# Patient Record
Sex: MALE | Race: BLACK OR AFRICAN AMERICAN | NOT HISPANIC OR LATINO | Employment: FULL TIME | URBAN - METROPOLITAN AREA
[De-identification: names, ages, dates, MRNs, and addresses within clinical notes are randomized per-mention and may not be internally consistent; named-entity substitution may affect disease eponyms.]

---

## 2017-05-02 ENCOUNTER — HOSPITAL ENCOUNTER (EMERGENCY)
Facility: HOSPITAL | Age: 37
Discharge: HOME | End: 2017-05-02
Attending: EMERGENCY MEDICINE

## 2017-05-02 VITALS
RESPIRATION RATE: 20 BRPM | HEIGHT: 64 IN | BODY MASS INDEX: 23.9 KG/M2 | DIASTOLIC BLOOD PRESSURE: 82 MMHG | WEIGHT: 140 LBS | TEMPERATURE: 98 F | SYSTOLIC BLOOD PRESSURE: 128 MMHG | OXYGEN SATURATION: 97 % | HEART RATE: 62 BPM

## 2017-05-02 DIAGNOSIS — R10.9 RIGHT FLANK PAIN: Primary | ICD-10-CM

## 2017-05-02 DIAGNOSIS — Z87.442 HISTORY OF KIDNEY STONES: ICD-10-CM

## 2017-05-02 PROCEDURE — 96372 THER/PROPH/DIAG INJ SC/IM: CPT

## 2017-05-02 PROCEDURE — 25000003 PHARM REV CODE 250: Performed by: PHYSICIAN ASSISTANT

## 2017-05-02 PROCEDURE — 63600175 PHARM REV CODE 636 W HCPCS: Performed by: PHYSICIAN ASSISTANT

## 2017-05-02 PROCEDURE — 99283 EMERGENCY DEPT VISIT LOW MDM: CPT | Mod: 25

## 2017-05-02 RX ORDER — TAMSULOSIN HYDROCHLORIDE 0.4 MG/1
0.4 CAPSULE ORAL DAILY
Qty: 10 CAPSULE | Refills: 0 | Status: SHIPPED | OUTPATIENT
Start: 2017-05-02 | End: 2018-09-30

## 2017-05-02 RX ORDER — ONDANSETRON 4 MG/1
4 TABLET, FILM COATED ORAL EVERY 12 HOURS PRN
Qty: 12 TABLET | Refills: 0 | Status: SHIPPED | OUTPATIENT
Start: 2017-05-02 | End: 2018-09-30

## 2017-05-02 RX ORDER — HYDROMORPHONE HYDROCHLORIDE 2 MG/ML
0.5 INJECTION, SOLUTION INTRAMUSCULAR; INTRAVENOUS; SUBCUTANEOUS
Status: COMPLETED | OUTPATIENT
Start: 2017-05-02 | End: 2017-05-02

## 2017-05-02 RX ORDER — HYDROCODONE BITARTRATE AND ACETAMINOPHEN 5; 325 MG/1; MG/1
1 TABLET ORAL EVERY 6 HOURS PRN
COMMUNITY
End: 2018-09-30

## 2017-05-02 RX ORDER — CEPHALEXIN 500 MG/1
500 CAPSULE ORAL EVERY 6 HOURS
COMMUNITY
End: 2018-09-30

## 2017-05-02 RX ORDER — ONDANSETRON 4 MG/1
4 TABLET, ORALLY DISINTEGRATING ORAL
Status: COMPLETED | OUTPATIENT
Start: 2017-05-02 | End: 2017-05-02

## 2017-05-02 RX ORDER — OXYCODONE AND ACETAMINOPHEN 10; 325 MG/1; MG/1
1 TABLET ORAL EVERY 6 HOURS PRN
Qty: 12 TABLET | Refills: 0 | Status: SHIPPED | OUTPATIENT
Start: 2017-05-02 | End: 2018-09-30

## 2017-05-02 RX ADMIN — ONDANSETRON 4 MG: 4 TABLET, ORALLY DISINTEGRATING ORAL at 07:05

## 2017-05-02 RX ADMIN — HYDROMORPHONE HYDROCHLORIDE 0.5 MG: 2 INJECTION INTRAMUSCULAR; INTRAVENOUS; SUBCUTANEOUS at 07:05

## 2017-05-02 NOTE — ED AVS SNAPSHOT
[facility]  [place] [address]               Francisco Powell   2017  6:17 PM   ED    Description:  Male : 1980   Department:  [facility]           Your Care was Coordinated By:     Provider Role From To    Yunior Teran MD Attending Provider 17 --    Tripp Hurtado PA-C Physician Assistant 17 --      Reason for Visit     Flank Pain           Diagnoses this Visit        Comments    Right flank pain    -  Primary     History of kidney stones           ED Disposition     None           To Do List           Follow-up Information     Follow up with Mary Beard MD. Schedule an appointment as soon as possible for a visit in 1 day.    Specialty:  Urology    Why:  For further evaluation    Contact information:    [address]  [place] [address]  [phone]          Go to [facility].    Specialty:  Emergency Medicine    Why:  If symptoms worsen    Contact information:    [place] [id number] [phone]       These Medications        Disp Refills Start End    oxycodone-acetaminophen (PERCOCET)  mg per tablet 12 tablet 0 2017     Take 1 tablet by mouth every 6 (six) hours as needed for Pain. - Oral    ondansetron (ZOFRAN) 4 MG tablet 12 tablet 0 2017     Take 1 tablet (4 mg total) by mouth every 12 (twelve) hours as needed. - Oral    tamsulosin (FLOMAX) 0.4 mg Cp24 10 capsule 0 2017    Take 1 capsule (0.4 mg total) by mouth once daily. - Oral      [facility]     [facility] On [facility] Nurse Care Line -  Assistance  Unless otherwise directed by your provider, please contact [facility], our nurse care line that is available for  assistance.     Registered nurses in the [facility] provide: appointment scheduling, clinical advisement, health education, and other advisory  services.  Call: [phone] (toll free)               Medications           Message regarding Medications     Verify the changes and/or additions to your medication regime listed below are the same as discussed with your clinician today.  If any of these changes or additions are incorrect, please notify your healthcare provider.        START taking these NEW medications        Refills    oxycodone-acetaminophen (PERCOCET)  mg per tablet 0    Sig: Take 1 tablet by mouth every 6 (six) hours as needed for Pain.    Class: Print    Route: Oral    ondansetron (ZOFRAN) 4 MG tablet 0    Sig: Take 1 tablet (4 mg total) by mouth every 12 (twelve) hours as needed.    Class: Print    Route: Oral    tamsulosin (FLOMAX) 0.4 mg Cp24 0    Sig: Take 1 capsule (0.4 mg total) by mouth once daily.    Class: Print    Route: Oral      These medications were administered today        Dose Freq    hydromorphone (PF) injection 0.5 mg 0.5 mg ED 1 Time    Sig: Inject 0.25 mLs (0.5 mg total) into the muscle ED 1 Time.    Class: Normal    Route: Intramuscular    ondansetron disintegrating tablet 4 mg 4 mg ED 1 Time    Sig: Take 1 tablet (4 mg total) by mouth ED 1 Time.    Class: Normal    Route: Oral           Verify that the below list of medications is an accurate representation of the medications you are currently taking.  If none reported, the list may be blank. If incorrect, please contact your healthcare provider. Carry this list with you in case of emergency.           Current Medications     cephALEXin (KEFLEX) 500 MG capsule Take 500 mg by mouth every 6 (six) hours.    hydrocodone-acetaminophen 5-325mg (NORCO) 5-325 mg per tablet Take 1 tablet by mouth every 6 (six) hours as needed for Pain.    hydromorphone (PF) injection 0.5 mg Inject 0.25 mLs (0.5 mg total) into the muscle ED 1 Time.    ondansetron (ZOFRAN) 4 MG tablet Take 1 tablet (4 mg total) by mouth every 12 (twelve) hours as needed.    ondansetron disintegrating  "tablet 4 mg Take 1 tablet (4 mg total) by mouth ED 1 Time.    oxycodone-acetaminophen (PERCOCET)  mg per tablet Take 1 tablet by mouth every 6 (six) hours as needed for Pain.    tamsulosin (FLOMAX) 0.4 mg Cp24 Take 1 capsule (0.4 mg total) by mouth once daily.           Clinical Reference Information           Your Vitals Were     BP Pulse Temp Resp Height Weight    125/84 (BP Location: Right arm, Patient Position: Sitting) 63 97.7 °F (36.5 °C) (Oral) 18 5' 4" (1.626 m) 63.5 kg (140 lb)    SpO2 BMI             95% 24.03 kg/m2         Allergies as of 5/2/2017     No Known Allergies      Immunizations Administered on Date of Encounter - 5/2/2017     None      ED Micro, Lab, POCT     Start Ordered       Status Ordering Provider    05/02/17 1800 05/02/17 1759    Once,   Status:  Canceled      Canceled       ED Imaging Orders     None      Discharge References/Attachments     KIDNEY STONES, UNDERSTANDING (ENGLISH)      [facility] Sign-Up     Activating your [facility] account is as easy as 1-2-3!     1) Visit [email or web address], select Sign Up Now, enter this activation code and your date of birth, then select Next.  [email or web address]  Expires: 6/16/2017  7:06 PM      2) Create a username and password to use when you visit [facility] in the future and select a security question in case you lose your password and select Next.    3) Enter your e-mail address and click Sign Up!    Additional Information  If you have questions, please e-mail [email or web address] or call [phone] to talk to our [facility] staff. Remember, [facility] is NOT to be used for urgent needs. For medical emergencies, dial 911.          [facility] complies with applicable Federal civil rights laws and does not discriminate on the basis of race, color, national origin, age, disability, or sex.        Language Assistance Services     ATTENTION: Language assistance services are available, free of charge. Please call [phone].  "     ATENCIÓN: Si habla español, tiene a bah disposición servicios gratuitos de asistencia lingüística. Llame al [phone].     CHÚ Ý: N?u b?n nói Ti?ng Vi?t, có các d?ch v? h? tr? ngôn ng? mi?n phí dành cho b?n. G?i s? [phone].

## 2017-05-02 NOTE — ED PROVIDER NOTES
"Encounter Date: 5/2/2017    SCRIBE #1 NOTE: I, Kasia Bloom , am scribing for, and in the presence of,  Tripp Hurtado PA-C . I have scribed the following portions of the note - Other sections scribed: HPI/ROS .       History     Chief Complaint   Patient presents with    Flank Pain     Pt reports pain for 2 months, and "now just hit me and worsened. I think I may have a kidney stone". No dysuria reported     Review of patient's allergies indicates:  No Known Allergies  HPI Comments: CC: Flank Pain     HPI: This 37 y.o. Male smoker presents to the ED c/o a few-days hx of acute-onset, intermittent, stabbing, worsening R sided flank pain. Pt states he has been experiencing similar episodes of flank pain intermittently for the past year. He reports a full workup for kidney stones at [facility] yesterday where he was dx with a 3 mm stone and was prescribed Keflex and Norco. However, pt states his pain continues to be severe, prompting his arrival to the ED today. Pt otherwise denies fever, SOB, CP, abdominal pain, N/V/D, dysuria, urinary frequency, testicular pain.    The history is provided by the patient. No  was used.     History reviewed. No pertinent past medical history.  History reviewed. No pertinent surgical history.  History reviewed. No pertinent family history.  Social History   Substance Use Topics    Smoking status: Current Every Day Smoker     Types: Cigarettes    Smokeless tobacco: None    Alcohol use Yes      Comment: rarely     Review of Systems   Constitutional: Negative for chills and fever.   HENT: Negative for ear pain and sore throat.    Eyes: Negative for pain and visual disturbance.   Respiratory: Negative for cough and shortness of breath.    Cardiovascular: Negative for chest pain.   Gastrointestinal: Negative for abdominal pain, diarrhea, nausea and vomiting.   Genitourinary: Positive for flank pain (R side). Negative for difficulty urinating, dysuria, hematuria, " scrotal swelling and testicular pain.        (-) dark urine    Musculoskeletal: Negative for back pain and neck pain.   Skin: Negative for rash.   Neurological: Negative for headaches.       Physical Exam   Initial Vitals   BP Pulse Resp Temp SpO2   05/02/17 1801 05/02/17 1801 05/02/17 1801 05/02/17 1801 05/02/17 1801   125/84 63 18 97.7 °F (36.5 °C) 95 %     Physical Exam    Nursing note and vitals reviewed.  Constitutional: He appears well-developed and well-nourished. He is not diaphoretic. No distress.   HENT:   Head: Normocephalic and atraumatic.   Nose: Nose normal.   Eyes: Conjunctivae and EOM are normal. Right eye exhibits no discharge. Left eye exhibits no discharge.   Neck: Normal range of motion. No tracheal deviation present. No JVD present.   Cardiovascular: Normal rate, regular rhythm and normal heart sounds. Exam reveals no friction rub.    No murmur heard.  Pulmonary/Chest: Breath sounds normal. No stridor. No respiratory distress. He has no wheezes. He has no rhonchi. He has no rales. He exhibits no tenderness.   Abdominal: Soft. He exhibits no distension. There is no tenderness. There is no rigidity, no rebound, no guarding, no CVA tenderness, no tenderness at McBurney's point and negative Mcgovern's sign.   Musculoskeletal: Normal range of motion. He exhibits tenderness (Mild reproducible TTP of R lumbar musculature. ).   No midline tenderness or bony deformities noted down the neck and spine. Ambulating well, without limp or pain.   Neurological: He is alert and oriented to person, place, and time.   Skin: Skin is warm and dry. No rash and no abscess noted. No erythema. No pallor.         ED Course   Procedures  Labs Reviewed - No data to display          Medical Decision Making:   History:   Old Medical Records: I decided to obtain old medical records.    This is an emergent evaluation of a 37 y.o. male with no PMHx presenting to the ED for R flank pain s/p being diagnosed with renal stone at [facility]  yesterday; requesting stronger pain medication. Denies fever, urinary symptoms, testicular pain, and abdominal pain. Vitals WNL, afebrile. Patient is non-toxic appearing and in no acute distress. Abdomen soft and nontender. Mild muscular TTP may be contributory to symptoms. Patient presents with documentation of a CT being performed yesterday at [facility] and being diagnosed with a renal stone by Dr. Washington. I discuss this case with Dr. Teran who advises no further workup in the ED indicated at this time. I doubt appendicitis, testicular torsion, SBO, and HZV. Currently on Keflex which covers for pyelonephritis. No uroseptic.     Given pain medication in ED. Discharged home with Percocet, Zofran, and Flomax. Instructed to follow up with urology for reevaluation and management of symptoms.     I discussed with the patient the diagnosis, treatment plan, indications for return to the emergency department, and for expected follow-up. The patient verbalized an understanding. The patient is asked if there are any questions or concerns. We discuss the case, until all issues are addressed to the patients satisfaction. Patient understands and is agreeable to the plan.     I discussed this patient with Dr. Teran who is in agreement with my assessment and plan.           Scribe Attestation:   Scribe #1: I performed the above scribed service and the documentation accurately describes the services I performed. I attest to the accuracy of the note.    Attending Attestation:     Physician Attestation Statement for NP/PA:   I have conducted a face to face encounter with this patient in addition to the NP/PA, due to NP/PA Request    Other NP/PA Attestation Additions:      Medical Decision Making: Patient presents with flank pain.  Was at another facility yesterday and diagnosed with a kidney stone.  Has follow-up with urology.  Here for uncontrolled pain.  No fever.  Vital signs stable.  Tolerating by mouth.  I do not feel  repeat workup is warranted this time.  We will treat the patient's pain and patient to follow-up with urology.       Physician Attestation for Scribe:  Physician Attestation Statement for Scribe #1: I, Tripp Hurtado PA-C , reviewed documentation, as scribed by Kasia Bloom  in my presence, and it is both accurate and complete.                 ED Course     Clinical Impression:   The primary encounter diagnosis was Right flank pain. A diagnosis of History of kidney stones was also pertinent to this visit.    Disposition:   Disposition: Discharged  Condition: Stable       Tripp Hurtado PA-C  05/02/17 2148       Yunior Teran MD  05/30/17 2121

## 2017-05-02 NOTE — ED TRIAGE NOTES
C/o rt. Flank pain x 1 week. Visited [facility]  2 days ago. Prescribed meds. Has only taken 1 dose of keflex today. C/o of worsening pain. . Denies fever. C/o nausea.

## 2018-09-30 ENCOUNTER — HOSPITAL ENCOUNTER (EMERGENCY)
Facility: HOSPITAL | Age: 38
Discharge: HOME | End: 2018-09-30
Attending: EMERGENCY MEDICINE
Payer: MEDICAID

## 2018-09-30 VITALS
HEART RATE: 55 BPM | TEMPERATURE: 98 F | RESPIRATION RATE: 17 BRPM | BODY MASS INDEX: 23.32 KG/M2 | OXYGEN SATURATION: 100 % | DIASTOLIC BLOOD PRESSURE: 86 MMHG | SYSTOLIC BLOOD PRESSURE: 145 MMHG | HEIGHT: 65 IN | WEIGHT: 140 LBS

## 2018-09-30 DIAGNOSIS — R10.9 RIGHT FLANK PAIN: Primary | ICD-10-CM

## 2018-09-30 DIAGNOSIS — R31.29 MICROSCOPIC HEMATURIA: ICD-10-CM

## 2018-09-30 LAB
ALBUMIN SERPL BCP-MCNC: 4.2 G/DL
ALP SERPL-CCNC: 60 U/L
ALT SERPL W/O P-5'-P-CCNC: 14 U/L
ANION GAP SERPL CALC-SCNC: 7 MMOL/L
AST SERPL-CCNC: 17 U/L
BACTERIA #/AREA URNS AUTO: ABNORMAL /HPF
BASOPHILS # BLD AUTO: 0.03 K/UL
BASOPHILS NFR BLD: 0.3 %
BILIRUB SERPL-MCNC: 0.5 MG/DL
BILIRUB UR QL STRIP: NEGATIVE
BUN SERPL-MCNC: 12 MG/DL
CALCIUM SERPL-MCNC: 9.9 MG/DL
CHLORIDE SERPL-SCNC: 108 MMOL/L
CLARITY UR REFRACT.AUTO: CLEAR
CO2 SERPL-SCNC: 28 MMOL/L
COLOR UR AUTO: YELLOW
CREAT SERPL-MCNC: 1.2 MG/DL
DIFFERENTIAL METHOD: ABNORMAL
EOSINOPHIL # BLD AUTO: 0.1 K/UL
EOSINOPHIL NFR BLD: 1 %
ERYTHROCYTE [DISTWIDTH] IN BLOOD BY AUTOMATED COUNT: 13.1 %
EST. GFR  (AFRICAN AMERICAN): >60 ML/MIN/1.73 M^2
EST. GFR  (NON AFRICAN AMERICAN): >60 ML/MIN/1.73 M^2
GLUCOSE SERPL-MCNC: 87 MG/DL
GLUCOSE UR QL STRIP: NEGATIVE
HCT VFR BLD AUTO: 41.7 %
HGB BLD-MCNC: 12.5 G/DL
HGB UR QL STRIP: ABNORMAL
IMM GRANULOCYTES # BLD AUTO: 0.02 K/UL
IMM GRANULOCYTES NFR BLD AUTO: 0.2 %
KETONES UR QL STRIP: NEGATIVE
LEUKOCYTE ESTERASE UR QL STRIP: NEGATIVE
LIPASE SERPL-CCNC: 23 U/L
LYMPHOCYTES # BLD AUTO: 3.2 K/UL
LYMPHOCYTES NFR BLD: 35.8 %
MCH RBC QN AUTO: 24.6 PG
MCHC RBC AUTO-ENTMCNC: 30 G/DL
MCV RBC AUTO: 82 FL
MICROSCOPIC COMMENT: ABNORMAL
MONOCYTES # BLD AUTO: 0.6 K/UL
MONOCYTES NFR BLD: 7.1 %
NEUTROPHILS # BLD AUTO: 4.9 K/UL
NEUTROPHILS NFR BLD: 55.6 %
NITRITE UR QL STRIP: NEGATIVE
NRBC BLD-RTO: 0 /100 WBC
PH UR STRIP: 6 [PH] (ref 5–8)
PLATELET # BLD AUTO: 185 K/UL
PMV BLD AUTO: 8.9 FL
POTASSIUM SERPL-SCNC: 3.8 MMOL/L
PROT SERPL-MCNC: 7 G/DL
PROT UR QL STRIP: NEGATIVE
RBC # BLD AUTO: 5.09 M/UL
RBC #/AREA URNS AUTO: 19 /HPF (ref 0–4)
SODIUM SERPL-SCNC: 143 MMOL/L
SP GR UR STRIP: 1.02 (ref 1–1.03)
URN SPEC COLLECT METH UR: ABNORMAL
UROBILINOGEN UR STRIP-ACNC: NEGATIVE EU/DL
WBC # BLD AUTO: 8.88 K/UL
WBC #/AREA URNS AUTO: 7 /HPF (ref 0–5)

## 2018-09-30 PROCEDURE — 96361 HYDRATE IV INFUSION ADD-ON: CPT

## 2018-09-30 PROCEDURE — 25000003 PHARM REV CODE 250: Performed by: PHYSICIAN ASSISTANT

## 2018-09-30 PROCEDURE — 96374 THER/PROPH/DIAG INJ IV PUSH: CPT

## 2018-09-30 PROCEDURE — 80053 COMPREHEN METABOLIC PANEL: CPT

## 2018-09-30 PROCEDURE — 87086 URINE CULTURE/COLONY COUNT: CPT

## 2018-09-30 PROCEDURE — 99284 EMERGENCY DEPT VISIT MOD MDM: CPT | Mod: 25

## 2018-09-30 PROCEDURE — 99284 EMERGENCY DEPT VISIT MOD MDM: CPT | Mod: ,,, | Performed by: PHYSICIAN ASSISTANT

## 2018-09-30 PROCEDURE — 83690 ASSAY OF LIPASE: CPT

## 2018-09-30 PROCEDURE — 63600175 PHARM REV CODE 636 W HCPCS: Performed by: PHYSICIAN ASSISTANT

## 2018-09-30 PROCEDURE — 81001 URINALYSIS AUTO W/SCOPE: CPT

## 2018-09-30 PROCEDURE — 96375 TX/PRO/DX INJ NEW DRUG ADDON: CPT

## 2018-09-30 PROCEDURE — 85025 COMPLETE CBC W/AUTO DIFF WBC: CPT

## 2018-09-30 RX ORDER — IBUPROFEN 800 MG/1
800 TABLET ORAL EVERY 6 HOURS PRN
Qty: 20 TABLET | Refills: 0 | Status: SHIPPED | OUTPATIENT
Start: 2018-09-30 | End: 2019-09-06 | Stop reason: ALTCHOICE

## 2018-09-30 RX ORDER — ONDANSETRON 2 MG/ML
4 INJECTION INTRAMUSCULAR; INTRAVENOUS
Status: COMPLETED | OUTPATIENT
Start: 2018-09-30 | End: 2018-09-30

## 2018-09-30 RX ORDER — KETOROLAC TROMETHAMINE 30 MG/ML
10 INJECTION, SOLUTION INTRAMUSCULAR; INTRAVENOUS
Status: COMPLETED | OUTPATIENT
Start: 2018-09-30 | End: 2018-09-30

## 2018-09-30 RX ORDER — CEFTRIAXONE 1 G/1
1 INJECTION, POWDER, FOR SOLUTION INTRAMUSCULAR; INTRAVENOUS
Status: COMPLETED | OUTPATIENT
Start: 2018-09-30 | End: 2018-09-30

## 2018-09-30 RX ADMIN — SODIUM CHLORIDE 1000 ML: 0.9 INJECTION, SOLUTION INTRAVENOUS at 08:09

## 2018-09-30 RX ADMIN — ONDANSETRON HYDROCHLORIDE 4 MG: 2 INJECTION, SOLUTION INTRAMUSCULAR; INTRAVENOUS at 08:09

## 2018-09-30 RX ADMIN — KETOROLAC TROMETHAMINE 10 MG: 30 INJECTION, SOLUTION INTRAMUSCULAR at 08:09

## 2018-09-30 RX ADMIN — CEFTRIAXONE SODIUM 1 G: 1 INJECTION, POWDER, FOR SOLUTION INTRAMUSCULAR; INTRAVENOUS at 09:09

## 2018-10-01 NOTE — ED PROVIDER NOTES
Encounter Date: 9/30/2018       History     Chief Complaint   Patient presents with    Flank Pain     Patient reports that he was diagnosed with renal stones x 4 years. Patient has not followed up with a Urologist. Pt having right flank pain     Patient is a 38-year-old male presenting to the ER for evaluation of right-sided flank pain. Patient states the symptoms started earlier today.  He describes it as intermittent and sharp.  Patient states that he did have 1 episode of vomiting yesterday.  He does have some slight nausea at this time.  He denies dysuria or hematuria.  He has noticed some urinary hesitancy over the last 1 week.  He denies radiation of this pain to the abdomen.  He denies testicular pain, redness or swelling.  Denies any penile discharge. No concerns for STDs.  Denies any fever chills at home.  Patient states that he was diagnosed with kidney stones many years ago but felt as though he has not passed any of them.  He has not seen a urologist for this in the past.  No prior abdominal surgeries.  Denies chest pain or shortness of breath.      The history is provided by the patient.     Review of patient's allergies indicates:  No Known Allergies  History reviewed. No pertinent past medical history.  History reviewed. No pertinent surgical history.  History reviewed. No pertinent family history.  Social History     Tobacco Use    Smoking status: Current Every Day Smoker     Types: Cigarettes   Substance Use Topics    Alcohol use: Yes     Comment: rarely    Drug use: Not on file     Review of Systems   Constitutional: Negative for chills and fever.   HENT: Negative for congestion.    Respiratory: Negative for cough and shortness of breath.    Cardiovascular: Negative for chest pain and palpitations.   Gastrointestinal: Positive for nausea and vomiting. Negative for abdominal pain.   Genitourinary: Positive for difficulty urinating (hesitancy ) and flank pain. Negative for dysuria, hematuria,  scrotal swelling, testicular pain and urgency.   Musculoskeletal: Negative for back pain.   Skin: Negative for rash.   Allergic/Immunologic: Negative for immunocompromised state.   Neurological: Negative for dizziness and weakness.   Hematological: Does not bruise/bleed easily.   Psychiatric/Behavioral: Negative for confusion.       Physical Exam     Initial Vitals [09/30/18 1941]   BP Pulse Resp Temp SpO2   134/82 72 18 98.2 °F (36.8 °C) 98 %      MAP       --         Physical Exam    Constitutional: He appears well-developed and well-nourished. He is not diaphoretic. No distress.   HENT:   Head: Normocephalic and atraumatic.   Eyes: Conjunctivae and EOM are normal.   Neck: Neck supple.   Cardiovascular: Normal rate, regular rhythm, normal heart sounds and intact distal pulses.   Pulmonary/Chest: Breath sounds normal.   Abdominal: Soft. Normal appearance. He exhibits no distension. Bowel sounds are absent. There is tenderness (rigth flank). There is CVA tenderness (right). There is no rigidity, no rebound and no guarding.   Neurological: He is alert and oriented to person, place, and time.   Skin: Skin is warm and dry.         ED Course   Procedures  Labs Reviewed   CBC W/ AUTO DIFFERENTIAL - Abnormal; Notable for the following components:       Result Value    Hemoglobin 12.5 (*)     MCH 24.6 (*)     MCHC 30.0 (*)     MPV 8.9 (*)     All other components within normal limits   COMPREHENSIVE METABOLIC PANEL - Abnormal; Notable for the following components:    Anion Gap 7 (*)     All other components within normal limits   URINALYSIS, REFLEX TO URINE CULTURE - Abnormal; Notable for the following components:    Occult Blood UA 1+ (*)     All other components within normal limits    Narrative:     Preferred Collection Type->Urine, Clean Catch   URINALYSIS MICROSCOPIC - Abnormal; Notable for the following components:    RBC, UA 19 (*)     WBC, UA 7 (*)     All other components within normal limits    Narrative:      Preferred Collection Type->Urine, Clean Catch   CULTURE, URINE   CULTURE, URINE   LIPASE          Imaging Results          CT Renal Stone Study ABD Pelvis WO (Final result)     Abnormal  Result time 09/30/18 21:17:56    Final result by Mert Hoang MD (09/30/18 21:17:56)                 Impression:      Right renal large staghorn type calculus with additional nephroliths, but no definite associated hydronephrosis.    Otherwise, no acute process seen on this noncontrast CT.    Right hepatic few scattered subcentimeter low-attenuation parenchymal foci which are too small to characterize but statistically likely represent cysts.    Right lower lobe 2-3 mm ground-glass nodule.  For a ground glass nodule <6 mm, Fleischner Society 2017 guidelines recommend no routine follow up. However, suspicious features could warrant follow up with non-contrast chest CT at 2 years and 4 years after discovery.    This report was flagged in Epic as abnormal.      Electronically signed by: Mert Hoang MD  Date:    09/30/2018  Time:    21:17             Narrative:    EXAMINATION:  CT RENAL STONE STUDY ABD PELVIS WO    CLINICAL HISTORY:  Flank pain, stone disease suspected;right;    TECHNIQUE:  Low dose axial images, sagittal and coronal reformations were obtained from the lung bases to the pubic symphysis.  Contrast was not administered.    COMPARISON:  None    FINDINGS:  Included lung bases show a 2-3 mm ground-glass nodule within the posterior basal segment right lower lobe.  Base of the heart is within normal limits.    The study is somewhat limited by paucity of intra-abdominal fat with closely apposed loops of bowel.  Liver is normal in size containing a few scattered subcentimeter low-attenuation parenchymal foci within the right hepatic lobe.  Noncontrast appearance of the gallbladder, pancreas, spleen, stomach, duodenum and bilateral adrenal glands are within normal limits.  No biliary ductal dilatation.    Bilateral kidneys  are normal in size, shape and location.  There is a large staghorn type calcification within the right renal pelvis with a few additional subcentimeter nephroliths at the upper and lower poles.  No radiodense calculus seen within the right ureter, left collecting system or urinary bladder.  No definite hydronephrosis or significant perinephric stranding on either side.  Urinary bladder is suboptimally distended.  Prostate and seminal vesicles are within normal limits.  Pelvic phleboliths noted.    No ascites, free air or lymphadenopathy definitively seen.  Aorta is nonaneurysmal.    Appendix and terminal ileum are within normal limits.  No evidence of bowel obstruction or inflammation.  No pneumatosis or portal venous gas.    Included osseous structures appear intact.                                       APC / Resident Notes:   Patient was seen in the ER promptly upon arrival.  He is afebrile, no acute distress. Physical examination reveals right CVA tenderness and tenderness over the right flank.  Abdomen soft, nondistended. Patient was given Toradol and Zofran in ED.    Laboratory studies show normal white count of 8.8.  Hemoglobin stable.  Chemistries were fairly unremarkable. Normal liver and kidney functions.  Lipase normal. Urinalysis does reveal 19 RBC and 7 WBC.  No leukocytosis.  Urine was sent for culture.  Pending results.    There is concern for kidney stones.  CT of the abdomen reveals right renal large staghorn type calculus with additional nephrolith.  No evidence of hydronephrosis.    There are other findings including right lower lobe lung nodule and the hepatic abnormality likely to represent a cyst.  Patient from on these abnormal findings.  He is to follow up with family doctor this week.    Suspect patient's symptoms are likely secondary to a passed kidney stone.  Will prescribe patient home on ibuprofen for discomfort.  Will give information for follow up with [facility].  Patient was given  strict return precautions. The care of this patient was overseen by attending physician who agrees with treatment, plan, and disposition.           Attending Attestation:     Physician Attestation Statement for NP/PA:   I discussed this assessment and plan of this patient with the NP/PA, but I did not personally examine the patient. The face to face encounter was performed by the NP/PA.                     Clinical Impression:   The primary encounter diagnosis was Right flank pain. A diagnosis of Microscopic hematuria was also pertinent to this visit.      Disposition:   Disposition: Discharged  Condition: Stable                        Fabienne Kennedy PA-C  09/30/18 2232       Alvina Alicia MD  10/01/18 0144

## 2018-10-01 NOTE — ED TRIAGE NOTES
Pt reports to ED with c/o right sided flank pain r/t to kidney stones he reports he's been dx with x 6yrs. Pt has not seen urologist. Pt denies fever/chills. Pt rates pain 10/10.     Patient identifiers verified and correct  LOC: The patient is awake, alert and aware of environment with an appropriate affect, the patient is oriented x 3 and speaking appropriately.   APPEARANCE: Patient appears comfortable and in no acute distress, patient is clean and well groomed.  SKIN: The skin is warm and dry, color consistent with ethnicity, patient has normal skin turgor and moist mucus membranes, skin intact, no breakdown or bruising noted.   MUSCULOSKELETAL: Patient moving all extremities spontaneously, no swelling noted.  RESPIRATORY: Airway is open and patent, respirations are spontaneous, patient has a normal effort and rate, no accessory muscle use noted

## 2018-10-02 LAB — BACTERIA UR CULT: NO GROWTH

## 2018-11-19 ENCOUNTER — TELEPHONE (OUTPATIENT)
Dept: UROLOGY | Facility: CLINIC | Age: 38
End: 2018-11-19

## 2018-11-19 NOTE — TELEPHONE ENCOUNTER
----- Message from Monie Canales sent at 11/19/2018  1:26 PM CST -----  Contact: [facility] / [phone] or Sergio Faustin [phone]  Patient is requesting a call back regarding, called to make an appointment, but wants to be seen this week if possible. Did want want to wait for the first available. Please advise

## 2018-11-27 ENCOUNTER — TELEPHONE (OUTPATIENT)
Dept: UROLOGY | Facility: CLINIC | Age: 38
End: 2018-11-27

## 2018-11-27 NOTE — TELEPHONE ENCOUNTER
----- Message from Marbella Muñoz sent at 11/27/2018 12:39 PM CST -----  Can Lorenzo see pt with History of Calculus of Kidney, Referral Order scanned in EPIC

## 2018-11-28 ENCOUNTER — OFFICE VISIT (OUTPATIENT)
Dept: UROLOGY | Facility: CLINIC | Age: 38
End: 2018-11-28
Payer: MEDICAID

## 2018-11-28 VITALS
OXYGEN SATURATION: 98 % | HEIGHT: 64 IN | SYSTOLIC BLOOD PRESSURE: 158 MMHG | HEART RATE: 57 BPM | BODY MASS INDEX: 23.22 KG/M2 | RESPIRATION RATE: 18 BRPM | WEIGHT: 136 LBS | DIASTOLIC BLOOD PRESSURE: 83 MMHG

## 2018-11-28 DIAGNOSIS — R35.1 NOCTURIA: ICD-10-CM

## 2018-11-28 DIAGNOSIS — R39.198 SLOW URINARY STREAM: ICD-10-CM

## 2018-11-28 DIAGNOSIS — N23 RENAL COLIC ON RIGHT SIDE: ICD-10-CM

## 2018-11-28 DIAGNOSIS — N20.0 STAGHORN RENAL CALCULUS: Primary | ICD-10-CM

## 2018-11-28 DIAGNOSIS — N20.0 STAGHORN CALCULUS: Primary | ICD-10-CM

## 2018-11-28 DIAGNOSIS — R11.2 NAUSEA AND VOMITING, INTRACTABILITY OF VOMITING NOT SPECIFIED, UNSPECIFIED VOMITING TYPE: ICD-10-CM

## 2018-11-28 DIAGNOSIS — R39.15 URINARY URGENCY: ICD-10-CM

## 2018-11-28 DIAGNOSIS — N20.0 KIDNEY STONE: ICD-10-CM

## 2018-11-28 LAB
BILIRUB SERPL-MCNC: ABNORMAL MG/DL
BLOOD URINE, POC: ABNORMAL
COLOR, POC UA: YELLOW
GLUCOSE UR QL STRIP: ABNORMAL
KETONES UR QL STRIP: ABNORMAL
LEUKOCYTE ESTERASE URINE, POC: ABNORMAL
NITRITE, POC UA: ABNORMAL
PH, POC UA: 8
PROTEIN, POC: 30
SPECIFIC GRAVITY, POC UA: 1.02
UROBILINOGEN, POC UA: 0.2

## 2018-11-28 PROCEDURE — 87086 URINE CULTURE/COLONY COUNT: CPT

## 2018-11-28 PROCEDURE — 99999 PR PBB SHADOW E&M-EST. PATIENT-LVL V: CPT | Mod: PBBFAC,,, | Performed by: NURSE PRACTITIONER

## 2018-11-28 PROCEDURE — 81002 URINALYSIS NONAUTO W/O SCOPE: CPT | Mod: PBBFAC,PO | Performed by: NURSE PRACTITIONER

## 2018-11-28 PROCEDURE — 99204 OFFICE O/P NEW MOD 45 MIN: CPT | Mod: S$PBB,,, | Performed by: NURSE PRACTITIONER

## 2018-11-28 PROCEDURE — 99215 OFFICE O/P EST HI 40 MIN: CPT | Mod: PBBFAC,PO | Performed by: NURSE PRACTITIONER

## 2018-11-28 RX ORDER — LIDOCAINE HYDROCHLORIDE 20 MG/ML
JELLY TOPICAL ONCE
Status: CANCELLED | OUTPATIENT
Start: 2018-11-28 | End: 2018-11-28

## 2018-11-28 RX ORDER — CIPROFLOXACIN 2 MG/ML
400 INJECTION, SOLUTION INTRAVENOUS
Status: CANCELLED | OUTPATIENT
Start: 2018-11-28

## 2018-11-28 RX ORDER — SODIUM CHLORIDE 9 MG/ML
INJECTION, SOLUTION INTRAVENOUS CONTINUOUS
Status: CANCELLED | OUTPATIENT
Start: 2018-11-28

## 2018-11-28 RX ORDER — OXYCODONE AND ACETAMINOPHEN 10; 325 MG/1; MG/1
1 TABLET ORAL EVERY 6 HOURS PRN
Qty: 20 TABLET | Refills: 0 | Status: ON HOLD | OUTPATIENT
Start: 2018-11-28 | End: 2018-12-04

## 2018-11-28 RX ORDER — KETOROLAC TROMETHAMINE 10 MG/1
10 TABLET, FILM COATED ORAL EVERY 6 HOURS
Qty: 20 TABLET | Refills: 1 | Status: ON HOLD | OUTPATIENT
Start: 2018-11-28 | End: 2018-12-04

## 2018-11-28 RX ORDER — ONDANSETRON HYDROCHLORIDE 8 MG/1
8 TABLET, FILM COATED ORAL EVERY 8 HOURS PRN
Qty: 21 TABLET | Refills: 0 | Status: ON HOLD | OUTPATIENT
Start: 2018-11-28 | End: 2018-12-06

## 2018-11-28 NOTE — PATIENT INSTRUCTIONS
1. Doctor note needed with restrictions for work due to renal colic.  2. Urine dipstick  3. Urine culture  4. May take Zofran as directed for nausea and vomiting.  5. Take toradol during the day as directed for pain; take Percocet at bedtime for pain.  6. Schedule right lithotripsy with Dr. Anne on 12/03/2018.  7. Consent discussed with patient and signed by patient.  8. Follow-up post-op.

## 2018-11-28 NOTE — PROGRESS NOTES
"Subjective:       Patient ID: Francisco Powell is a 38 y.o. male.    Chief Complaint: Nephrolithiasis    Patient is new to me. He is a 37 yo AAM who is here today for kidney stones. He reports this a recurrent issue for him, but the pain has intensified which has caused him to seek treatment. He has been seen in the ED on 11/17/18, 9/30/18, and 5/02/18 for right flank pain. CT was performed on 11/17/18 and showed "Large staghorn calculus of the right renal pelvis with nonobstructing stones also seen throughout the right kidney." Patient denies left flank pain at this time. Patient is here today with his girlfriend.       Flank Pain   This is a recurrent problem. The problem occurs daily. The problem has been gradually worsening since onset. The pain is present in the costovertebral angle (right). The quality of the pain is described as stabbing. The pain does not radiate. The pain is at a severity of 10/10. The pain is severe. The symptoms are aggravated by sitting and position. Pertinent negatives include no abdominal pain, bladder incontinence, bowel incontinence, dysuria, fever, headaches, pelvic pain or weakness. He has tried NSAIDs (Ibuprofen) for the symptoms. The treatment provided no relief.     Review of Systems   Constitutional: Positive for appetite change, chills (decreased) and diaphoresis. Negative for fatigue and fever.   Gastrointestinal: Positive for constipation, nausea and vomiting. Negative for abdominal pain, bowel incontinence and diarrhea.        Bloating   Genitourinary: Positive for flank pain and urgency. Negative for bladder incontinence, decreased urine volume, difficulty urinating, discharge, dysuria, frequency, hematuria, pelvic pain, penile pain, penile swelling, scrotal swelling and testicular pain.        Nocturia x2-3  Straining with urination  Hesitancy  Slow urinary stream     Neurological: Positive for dizziness. Negative for weakness and headaches.   Psychiatric/Behavioral: " Negative.        Objective:      Physical Exam   Constitutional: He is oriented to person, place, and time. He appears well-developed and well-nourished. No distress.   HENT:   Head: Normocephalic and atraumatic.   Eyes: EOM are normal. Pupils are equal, round, and reactive to light.   Neck: Normal range of motion.   Cardiovascular: Bradycardia present.   Pulmonary/Chest: Effort normal. No respiratory distress.   Abdominal: Soft. There is no tenderness.   Musculoskeletal: Normal range of motion.   Right CVAT    Neurological: He is alert and oriented to person, place, and time. Coordination normal.   Skin: Skin is warm and dry.   Psychiatric: He has a normal mood and affect. His behavior is normal. Judgment and thought content normal.   Nursing note and vitals reviewed.      Assessment:       1. Renal stones    2. Renal colic on right side    3. Nausea and vomiting, intractability of vomiting not specified, unspecified vomiting type    4. Urinary urgency    5. Slow urinary stream    6. Nocturia        Plan:       Francisco was seen today for nephrolithiasis.    Diagnoses and all orders for this visit:    Staghorn renal calculus    Renal colic on right side    Nausea and vomiting, intractability of vomiting not specified, unspecified vomiting type  -     ondansetron (ZOFRAN) 8 MG tablet; Take 1 tablet (8 mg total) by mouth every 8 (eight) hours as needed for Nausea.    Urinary urgency  -     POCT URINE DIPSTICK WITHOUT MICROSCOPE  -     Urine culture    Slow urinary stream    Nocturia    Other orders  1. Take toradol during the day as directed for pain; take Percocet at bedtime for pain.  2. Schedule right lithotripsy with Dr. Anne on 12/03/2018.  3. Consent discussed with patient and signed by patient.    Follow-up post-op.    Angella Spivey NP

## 2018-11-28 NOTE — LETTER
November 28, 2018      Nanda Bui, PA  [address]  [facility] [address]           [facility]  [address] [place] [address] [address]  [facility] [address]  Phone: [phone]  Fax: [phone]          Patient: Francisco Powell   MR Number: [id number]   YOB: 1980   Date of Visit: 11/28/2018       Dear Nanda Bui:    Thank you for referring Francisco Powell to me for evaluation. Attached you will find relevant portions of my assessment and plan of care.    If you have questions, please do not hesitate to call me. I look forward to following Francisco Powell along with you.    Sincerely,    Angella Spivey, NP    Enclosure  CC:  No Recipients    If you would like to receive this communication electronically, please contact [email or web address] or [phone] to request more information on [employer] Link access.    For providers and/or their staff who would like to refer a patient to [facility], please contact us through our one-stop-shop provider referral line, [facility] , at [phone].    If you feel you have received this communication in error or would no longer like to receive these types of communications, please e-mail [email or web address]

## 2018-11-29 LAB — BACTERIA UR CULT: NO GROWTH

## 2018-11-30 ENCOUNTER — TELEPHONE (OUTPATIENT)
Dept: UROLOGY | Facility: CLINIC | Age: 38
End: 2018-11-30

## 2018-11-30 NOTE — TELEPHONE ENCOUNTER
----- Message from Angella Spivey NP sent at 11/30/2018 11:14 AM CST -----  Please inform patient his urine cx was normal. He does not have a UTI.

## 2018-12-03 PROBLEM — N20.0 STAGHORN CALCULUS: Status: ACTIVE | Noted: 2018-12-03

## 2018-12-04 DIAGNOSIS — N20.0 KIDNEY STONE: Primary | ICD-10-CM

## 2018-12-04 RX ORDER — SODIUM CHLORIDE 9 MG/ML
INJECTION, SOLUTION INTRAVENOUS CONTINUOUS
Status: CANCELLED | OUTPATIENT
Start: 2018-12-04

## 2018-12-04 RX ORDER — LIDOCAINE HYDROCHLORIDE 20 MG/ML
JELLY TOPICAL ONCE
Status: CANCELLED | OUTPATIENT
Start: 2018-12-04 | End: 2018-12-04

## 2018-12-04 RX ORDER — CIPROFLOXACIN 2 MG/ML
400 INJECTION, SOLUTION INTRAVENOUS
Status: CANCELLED | OUTPATIENT
Start: 2018-12-04

## 2018-12-05 DIAGNOSIS — N20.0 KIDNEY STONE: Primary | ICD-10-CM

## 2018-12-11 ENCOUNTER — TELEPHONE (OUTPATIENT)
Dept: UROLOGY | Facility: CLINIC | Age: 38
End: 2018-12-11

## 2018-12-11 NOTE — TELEPHONE ENCOUNTER
----- Message from Karla Doyle sent at 12/10/2018  5:04 PM CST -----  Contact: [phone]/ [facility]  Pharmacy would like to speak with you about the following medication as diagnosis is needed before filling. Please advise.     1. oxyCODONE-acetaminophen (PERCOCET)  mg per tablet

## 2018-12-12 ENCOUNTER — OFFICE VISIT (OUTPATIENT)
Dept: UROLOGY | Facility: CLINIC | Age: 38
End: 2018-12-12
Payer: MEDICAID

## 2018-12-12 VITALS
DIASTOLIC BLOOD PRESSURE: 72 MMHG | HEIGHT: 64 IN | BODY MASS INDEX: 23.56 KG/M2 | RESPIRATION RATE: 19 BRPM | HEART RATE: 71 BPM | OXYGEN SATURATION: 99 % | SYSTOLIC BLOOD PRESSURE: 114 MMHG | WEIGHT: 138 LBS

## 2018-12-12 DIAGNOSIS — N20.0 STAGHORN RENAL CALCULUS: ICD-10-CM

## 2018-12-12 DIAGNOSIS — R30.0 DYSURIA: ICD-10-CM

## 2018-12-12 DIAGNOSIS — N23 RENAL COLIC ON RIGHT SIDE: ICD-10-CM

## 2018-12-12 DIAGNOSIS — N20.0 KIDNEY STONE: Primary | ICD-10-CM

## 2018-12-12 DIAGNOSIS — Z98.890 POST-OPERATIVE STATE: Primary | ICD-10-CM

## 2018-12-12 LAB
BILIRUB SERPL-MCNC: NORMAL MG/DL
BLOOD URINE, POC: NORMAL
COLOR, POC UA: YELLOW
GLUCOSE UR QL STRIP: NORMAL
KETONES UR QL STRIP: NORMAL
LEUKOCYTE ESTERASE URINE, POC: NORMAL
NITRITE, POC UA: NORMAL
PH, POC UA: 6.5
PROTEIN, POC: 100
SPECIFIC GRAVITY, POC UA: 1.01
UROBILINOGEN, POC UA: 0.2

## 2018-12-12 PROCEDURE — 99214 OFFICE O/P EST MOD 30 MIN: CPT | Mod: PBBFAC,PO | Performed by: NURSE PRACTITIONER

## 2018-12-12 PROCEDURE — 81002 URINALYSIS NONAUTO W/O SCOPE: CPT | Mod: PBBFAC,PO | Performed by: NURSE PRACTITIONER

## 2018-12-12 PROCEDURE — 99999 PR PBB SHADOW E&M-EST. PATIENT-LVL IV: CPT | Mod: PBBFAC,,, | Performed by: NURSE PRACTITIONER

## 2018-12-12 PROCEDURE — 99024 POSTOP FOLLOW-UP VISIT: CPT | Mod: ,,, | Performed by: NURSE PRACTITIONER

## 2018-12-12 RX ORDER — CIPROFLOXACIN 2 MG/ML
400 INJECTION, SOLUTION INTRAVENOUS
Status: CANCELLED | OUTPATIENT
Start: 2018-12-12

## 2018-12-12 RX ORDER — SODIUM CHLORIDE 9 MG/ML
INJECTION, SOLUTION INTRAVENOUS CONTINUOUS
Status: CANCELLED | OUTPATIENT
Start: 2018-12-12

## 2018-12-12 RX ORDER — LIDOCAINE HYDROCHLORIDE 20 MG/ML
JELLY TOPICAL ONCE
Status: CANCELLED | OUTPATIENT
Start: 2018-12-12 | End: 2018-12-12

## 2018-12-12 NOTE — PATIENT INSTRUCTIONS
Urine dipstick  Continue to strain urine at home.  Low oxalate diet (handout provided)  Schedule stone washout with Dr. Anne for Monday, 12/17/2018.  Consent discussed with patient and signed by patient.   Follow-up post op.

## 2018-12-12 NOTE — PROGRESS NOTES
Subjective:       Patient ID: Francisco Powell is a 38 y.o. male.    Chief Complaint: Nephrolithiasis    Patient is here today for his post op evaluation. He is S/P right lithotripsy on 12/6/2018 and right ureteroscopy with stent placement on 12/3/2018 by Dr. Anne. Patient still has some right flank tenderness and reported hematuria with clots after sx. Hematuria last noted 1 week ago and now has resolved. He reports passing sand, but no particles large enough to capture. KUB performed yesterday and not multiple stone fragments in right kidney. Right ureter stent also noted. Results reviewed with patient. Patient is here today with his significant other.       Other   Chronicity: Right lithotripsy  Episode onset: 12/6/2018. The problem occurs daily. The problem has been gradually improving. Associated symptoms include urinary symptoms (dysuria and hematuria). Pertinent negatives include no abdominal pain, change in bowel habit, chills, fatigue, fever, headaches, nausea, swollen glands, vomiting or weakness. Nothing aggravates the symptoms. Treatments tried: Right ureteroscopy with stent and lithotripsy. The treatment provided mild relief.     Review of Systems   Constitutional: Negative for appetite change, chills, fatigue and fever.   Gastrointestinal: Negative for abdominal pain, blood in stool, change in bowel habit, constipation, diarrhea, nausea and vomiting.   Genitourinary: Positive for dysuria, flank pain (right), hematuria and urgency. Negative for decreased urine volume, difficulty urinating, discharge, frequency, penile pain, penile swelling, scrotal swelling and testicular pain.   Neurological: Negative for dizziness, weakness and headaches.   Psychiatric/Behavioral: Negative.        Objective:      Physical Exam   Constitutional: He is oriented to person, place, and time. He appears well-developed and well-nourished. No distress.   HENT:   Head: Normocephalic and atraumatic.   Eyes: EOM are normal.  Pupils are equal, round, and reactive to light.   Neck: Normal range of motion.   Cardiovascular: Normal rate.   Pulmonary/Chest: Effort normal. No respiratory distress.   Abdominal: Soft. There is no tenderness.   Musculoskeletal: Normal range of motion. He exhibits no edema.   Right CVAT   Neurological: He is alert and oriented to person, place, and time. Coordination normal.   Skin: Skin is warm and dry.   Psychiatric: He has a normal mood and affect. His behavior is normal. Judgment and thought content normal.   Nursing note and vitals reviewed.      Assessment:       1. Post-operative state    2. Staghorn renal calculus    3. Renal colic on right side        Plan:         Francisco was seen today for nephrolithiasis.    Diagnoses and all orders for this visit:    Post-operative state    Staghorn renal calculus    Renal colic on right side    Dysuria  -     POCT URINE DIPSTICK WITHOUT MICROSCOPE    Other orders  1. Continue to strain urine at home.  2. Low oxalate diet (handout provided)  3. Schedule stone washout with Dr. Anne for Monday, 12/17/2018.  4. Consent discussed with patient and signed by patient.     Follow-up post op.     Angella Spivey NP

## 2019-01-02 ENCOUNTER — TELEPHONE (OUTPATIENT)
Dept: UROLOGY | Facility: CLINIC | Age: 39
End: 2019-01-02

## 2019-01-02 NOTE — TELEPHONE ENCOUNTER
"Spoke to patient about his 3pm appt today for his post op with leydi- I told patient he did not need to come to this po op aapt since he did not have his surgery and the patient stated "he wasn't coming anyways"  "

## 2019-08-20 ENCOUNTER — TELEPHONE (OUTPATIENT)
Dept: UROLOGY | Facility: CLINIC | Age: 39
End: 2019-08-20

## 2019-08-20 NOTE — TELEPHONE ENCOUNTER
----- Message from Frannie Nuñez sent at 8/20/2019 10:38 AM CDT -----  Contact: pt  Pt would like to be called back regarding getting a appt    Pt can be reached at [phone] or [phone]

## 2019-08-26 ENCOUNTER — OFFICE VISIT (OUTPATIENT)
Dept: UROLOGY | Facility: CLINIC | Age: 39
End: 2019-08-26
Payer: MEDICAID

## 2019-08-26 VITALS
WEIGHT: 130 LBS | DIASTOLIC BLOOD PRESSURE: 87 MMHG | RESPIRATION RATE: 19 BRPM | BODY MASS INDEX: 22.2 KG/M2 | HEART RATE: 67 BPM | OXYGEN SATURATION: 98 % | HEIGHT: 64 IN | SYSTOLIC BLOOD PRESSURE: 132 MMHG

## 2019-08-26 DIAGNOSIS — Z09 ENCOUNTER FOR EXAMINATION FOLLOWING TREATMENT AT HOSPITAL: Primary | ICD-10-CM

## 2019-08-26 DIAGNOSIS — N20.0 KIDNEY STONE: ICD-10-CM

## 2019-08-26 DIAGNOSIS — N23 RENAL COLIC ON RIGHT SIDE: ICD-10-CM

## 2019-08-26 DIAGNOSIS — R30.0 DYSURIA: ICD-10-CM

## 2019-08-26 DIAGNOSIS — R31.0 HEMATURIA, GROSS: ICD-10-CM

## 2019-08-26 DIAGNOSIS — N20.1 RIGHT URETERAL STONE: ICD-10-CM

## 2019-08-26 LAB
BILIRUB SERPL-MCNC: ABNORMAL MG/DL
BLOOD URINE, POC: ABNORMAL
COLOR, POC UA: YELLOW
GLUCOSE UR QL STRIP: ABNORMAL
KETONES UR QL STRIP: ABNORMAL
LEUKOCYTE ESTERASE URINE, POC: ABNORMAL
NITRITE, POC UA: ABNORMAL
PH, POC UA: 7
PROTEIN, POC: 100
SPECIFIC GRAVITY, POC UA: 1.01
UROBILINOGEN, POC UA: 0.2

## 2019-08-26 PROCEDURE — 99214 OFFICE O/P EST MOD 30 MIN: CPT | Mod: S$PBB,,, | Performed by: NURSE PRACTITIONER

## 2019-08-26 PROCEDURE — 99999 PR PBB SHADOW E&M-EST. PATIENT-LVL IV: ICD-10-PCS | Mod: PBBFAC,,, | Performed by: NURSE PRACTITIONER

## 2019-08-26 PROCEDURE — 99214 OFFICE O/P EST MOD 30 MIN: CPT | Mod: PBBFAC,PO | Performed by: NURSE PRACTITIONER

## 2019-08-26 PROCEDURE — 87086 URINE CULTURE/COLONY COUNT: CPT

## 2019-08-26 PROCEDURE — 99214 PR OFFICE/OUTPT VISIT, EST, LEVL IV, 30-39 MIN: ICD-10-PCS | Mod: S$PBB,,, | Performed by: NURSE PRACTITIONER

## 2019-08-26 PROCEDURE — 99999 PR PBB SHADOW E&M-EST. PATIENT-LVL IV: CPT | Mod: PBBFAC,,, | Performed by: NURSE PRACTITIONER

## 2019-08-26 PROCEDURE — 81002 URINALYSIS NONAUTO W/O SCOPE: CPT | Mod: PBBFAC,PO | Performed by: NURSE PRACTITIONER

## 2019-08-26 RX ORDER — CEPHALEXIN 500 MG/1
CAPSULE ORAL
Refills: 0 | COMMUNITY
Start: 2019-07-13 | End: 2019-09-13 | Stop reason: ALTCHOICE

## 2019-08-26 RX ORDER — PHENAZOPYRIDINE HYDROCHLORIDE 100 MG/1
100 TABLET, FILM COATED ORAL 3 TIMES DAILY PRN
Qty: 15 TABLET | Refills: 0 | Status: SHIPPED | OUTPATIENT
Start: 2019-08-26 | End: 2019-08-31

## 2019-08-26 RX ORDER — TAMSULOSIN HYDROCHLORIDE 0.4 MG/1
0.4 CAPSULE ORAL DAILY
Qty: 30 CAPSULE | Refills: 0 | Status: SHIPPED | OUTPATIENT
Start: 2019-08-26 | End: 2019-09-24

## 2019-08-26 NOTE — PATIENT INSTRUCTIONS
1. Urine dipstick and urine cx  2. Start Tamsulosin 0.4 mg daily to help pass stones in right ureter.  3. Take Pyridium for pain with urinating.   4. Continue Cipro (antibiotic).  5. Strain urine at home and bring in any collected stone particles to clinic for analysis.   6. Drink 2 liters of water daily.   7. Schedule lithotripsy and possible stent removal by Dr. Anne.

## 2019-08-26 NOTE — PROGRESS NOTES
"Subjective:       Patient ID: Francisco Powell is a 39 y.o. male.    Chief Complaint: Nephrolithiasis (patient has stent)    Patient is a 38 yo AAM who is here today for an ER follow-up. Patient was seen in the ER on 8/18/19 with complaints of dysuria, penile pain, and blood in his urine. CT was performed at that time and showed, "The right kidney contains 2 large stones the largest of which measuring 0.9 cm.  There are numerous stones identified within the proximal and mid right ureter measuring up to 0.6 cm." Right ureteral stent in place. Patient was a no show to his post-op appt after cystoscopy, retrograde pyelogram with ureteral stent placement by Dr. Anne on 12/17/18. Patient reports he was having personal issues at that time.     Dysuria    This is a new problem. The current episode started 1 to 4 weeks ago. The problem has been gradually improving. The quality of the pain is described as burning. The pain is at a severity of 10/10. The pain is severe. There has been no fever. He is sexually active. There is no history of pyelonephritis. Associated symptoms include frequency, hematuria and urgency. Pertinent negatives include no behavior changes, chills, discharge, flank pain, hesitancy, nausea, sweats, vomiting, weight loss, bubble bath use, constipation or withholding. He has tried antibiotics for the symptoms. The treatment provided mild relief. His past medical history is significant for kidney stones and a urological procedure. There is no history of diabetes mellitus, hypertension or recurrent UTIs.     Review of Systems   Constitutional: Negative for appetite change, chills, fatigue, fever and weight loss.   Gastrointestinal: Negative for abdominal pain, constipation, diarrhea, nausea and vomiting.   Genitourinary: Positive for difficulty urinating (Straining to void), dysuria, frequency, hematuria, penile pain and urgency. Negative for discharge, flank pain, hesitancy, penile swelling, scrotal " swelling and testicular pain.        Perineum discomfort   Neurological: Negative for dizziness and headaches.   Psychiatric/Behavioral: Negative.        Objective:      Physical Exam   Constitutional: He is oriented to person, place, and time. He appears well-developed and well-nourished. No distress.   HENT:   Head: Normocephalic and atraumatic.   Eyes: Pupils are equal, round, and reactive to light. EOM are normal.   Neck: Normal range of motion.   Cardiovascular: Normal rate.   Pulmonary/Chest: Effort normal. No respiratory distress.   Abdominal: Soft. There is no tenderness.   Musculoskeletal: Normal range of motion. He exhibits no edema.   Neurological: He is alert and oriented to person, place, and time. Coordination normal.   Skin: Skin is warm and dry.   Psychiatric: He has a normal mood and affect. His behavior is normal. Judgment and thought content normal.   Nursing note and vitals reviewed.      Assessment:       1. Encounter for examination following treatment at hospital    2. Renal colic on right side    3. Right ureteral stone    4. Kidney stone    5. Hematuria, gross    6. Dysuria        Plan:         Francisco was seen today for nephrolithiasis.    Diagnoses and all orders for this visit:    Encounter for examination following treatment at hospital    Renal colic on right side    Right ureteral stone  -     tamsulosin (FLOMAX) 0.4 mg Cap; Take 1 capsule (0.4 mg total) by mouth once daily.    Kidney stone  -     tamsulosin (FLOMAX) 0.4 mg Cap; Take 1 capsule (0.4 mg total) by mouth once daily.    Hematuria, gross  -     POCT URINE DIPSTICK WITHOUT MICROSCOPE  -     Urine culture    Dysuria  -     POCT URINE DIPSTICK WITHOUT MICROSCOPE  -     phenazopyridine (PYRIDIUM) 100 MG tablet; Take 1 tablet (100 mg total) by mouth 3 (three) times daily as needed for Pain.  -     Urine culture    Other orders  1. Continue Cipro (antibiotic).  2. Strain urine at home and bring in any collected stone particles to  clinic for analysis.   3. Drink 2 liters of water daily.   4. Schedule lithotripsy and possible stent removal by Dr. Anne.     Angella Spivey, NP

## 2019-08-27 LAB — BACTERIA UR CULT: NORMAL

## 2019-08-28 ENCOUNTER — TELEPHONE (OUTPATIENT)
Dept: UROLOGY | Facility: CLINIC | Age: 39
End: 2019-08-28

## 2019-08-28 DIAGNOSIS — N20.0 KIDNEY STONE: Primary | ICD-10-CM

## 2019-08-28 RX ORDER — SODIUM CHLORIDE 9 MG/ML
INJECTION, SOLUTION INTRAVENOUS CONTINUOUS
Status: CANCELLED | OUTPATIENT
Start: 2019-08-28

## 2019-08-28 RX ORDER — CIPROFLOXACIN 2 MG/ML
400 INJECTION, SOLUTION INTRAVENOUS
Status: CANCELLED | OUTPATIENT
Start: 2019-08-28

## 2019-09-06 ENCOUNTER — TELEPHONE (OUTPATIENT)
Dept: UROLOGY | Facility: CLINIC | Age: 39
End: 2019-09-06

## 2019-09-06 DIAGNOSIS — N23 RENAL COLIC: Primary | ICD-10-CM

## 2019-09-06 RX ORDER — KETOROLAC TROMETHAMINE 10 MG/1
10 TABLET, FILM COATED ORAL EVERY 6 HOURS PRN
Qty: 20 TABLET | Refills: 0 | Status: SHIPPED | OUTPATIENT
Start: 2019-09-06 | End: 2019-09-11

## 2019-09-06 NOTE — TELEPHONE ENCOUNTER
"----- Message from Angella Spivey NP sent at 9/6/2019  3:31 PM CDT -----  Please inform patient Toradol sent to [facility]. If pain medication does not work, instruct patient to go to ER for evaluation.   ----- Message -----  From: Elisa Alonzo MA  Sent: 9/6/2019   3:19 PM  To: Angella Spivey NP    Patient called stating he is having "bad pain" and would like some kind of pain medicine called into his pharmacy.    I explained dr oshea was out until Monday.    He does have surgey scheduled for 9/16    "

## 2019-09-06 NOTE — TELEPHONE ENCOUNTER
----- Message from Jennifer Ocampo sent at 9/6/2019  9:56 AM CDT -----  Contact: [phone]/bdtg  Patient requesting to speak with you regarding his surgery. Please advise.

## 2019-09-09 ENCOUNTER — TELEPHONE (OUTPATIENT)
Dept: UROLOGY | Facility: CLINIC | Age: 39
End: 2019-09-09

## 2019-09-09 NOTE — TELEPHONE ENCOUNTER
----- Message from Alma Amaro sent at 9/9/2019  4:44 PM CDT -----  Contact: [phone]/self  Patient requesting to speak with you about his prescriptions   Please call back to assist at [phone]

## 2019-09-09 NOTE — TELEPHONE ENCOUNTER
----- Message from Harry Mejia MA sent at 9/9/2019  3:21 PM CDT -----  Hi    In the process of making pre-reg calls for labs, the above named patient stated that he was out of medication ( Pain) and asked me to reach out to you all to let you all know this.  Please give him a call  to discuss this when time avail for you all.  He can be reached at [phone]    Thanks

## 2019-09-12 ENCOUNTER — TELEPHONE (OUTPATIENT)
Dept: UROLOGY | Facility: CLINIC | Age: 39
End: 2019-09-12

## 2019-09-19 PROBLEM — N20.1 URETERAL CALCULUS, RIGHT: Status: ACTIVE | Noted: 2019-09-19

## 2019-09-27 DIAGNOSIS — R31.0 HEMATURIA, GROSS: ICD-10-CM

## 2019-09-27 DIAGNOSIS — N20.0 KIDNEY STONE: ICD-10-CM

## 2019-09-27 DIAGNOSIS — N20.1 RIGHT URETERAL STONE: Primary | ICD-10-CM

## 2019-10-03 ENCOUNTER — OFFICE VISIT (OUTPATIENT)
Dept: UROLOGY | Facility: CLINIC | Age: 39
End: 2019-10-03
Payer: MEDICAID

## 2019-10-03 VITALS
SYSTOLIC BLOOD PRESSURE: 119 MMHG | TEMPERATURE: 98 F | HEIGHT: 65 IN | DIASTOLIC BLOOD PRESSURE: 59 MMHG | HEART RATE: 59 BPM | BODY MASS INDEX: 23.88 KG/M2 | WEIGHT: 143.31 LBS

## 2019-10-03 DIAGNOSIS — N20.1 URETERAL CALCULUS, RIGHT: ICD-10-CM

## 2019-10-03 DIAGNOSIS — N20.0 STAGHORN CALCULUS: ICD-10-CM

## 2019-10-03 DIAGNOSIS — Z98.890 POST-OPERATIVE STATE: Primary | ICD-10-CM

## 2019-10-03 PROCEDURE — 99999 PR PBB SHADOW E&M-EST. PATIENT-LVL IV: ICD-10-PCS | Mod: PBBFAC,,, | Performed by: NURSE PRACTITIONER

## 2019-10-03 PROCEDURE — 99024 POSTOP FOLLOW-UP VISIT: CPT | Mod: ,,, | Performed by: NURSE PRACTITIONER

## 2019-10-03 PROCEDURE — 99214 OFFICE O/P EST MOD 30 MIN: CPT | Mod: PBBFAC,PO | Performed by: NURSE PRACTITIONER

## 2019-10-03 PROCEDURE — 99999 PR PBB SHADOW E&M-EST. PATIENT-LVL IV: CPT | Mod: PBBFAC,,, | Performed by: NURSE PRACTITIONER

## 2019-10-03 PROCEDURE — 99024 PR POST-OP FOLLOW-UP VISIT: ICD-10-PCS | Mod: ,,, | Performed by: NURSE PRACTITIONER

## 2019-10-03 NOTE — PROGRESS NOTES
Subjective:       Patient ID: Francisco Powell is a 39 y.o. male.    Chief Complaint: Post-op Evaluation    Patient is here today for his post-op evaluation. He is S/P ureteroscopy, laser lithotripsy with stone basket extraction, complex encrusted stent removal and right stent replacement by Dr. Anne on 9/26/19. Two prior procedures were performed to try to help remove the stent on 9/16/19 and 9/19/19.     Other   Chronicity: S/P ureteroscopy. Episode onset: 9/26/19. Associated symptoms include urinary symptoms. Pertinent negatives include no abdominal pain, anorexia, arthralgias, change in bowel habit, chills, diaphoresis, fatigue, fever, headaches, nausea, swollen glands, vomiting or weakness. Nothing aggravates the symptoms. He has tried oral narcotics ( laser lithotripsy with stone basket extraction and stent replacement) for the symptoms. The treatment provided moderate relief.     Review of Systems   Constitutional: Negative for appetite change, chills, diaphoresis, fatigue and fever.   Gastrointestinal: Negative for abdominal pain, anorexia, change in bowel habit, constipation, diarrhea, nausea and vomiting.   Genitourinary: Positive for frequency and penile pain (discomfort from procedure). Negative for decreased urine volume, difficulty urinating, discharge, dysuria, flank pain, hematuria, penile swelling, scrotal swelling, testicular pain and urgency.   Musculoskeletal: Negative for arthralgias.   Neurological: Negative for dizziness, weakness and headaches.   Psychiatric/Behavioral: Negative.        Objective:      Physical Exam   Constitutional: He is oriented to person, place, and time. He appears well-developed and well-nourished. No distress.   HENT:   Head: Normocephalic and atraumatic.   Eyes: Pupils are equal, round, and reactive to light. EOM are normal.   Neck: Normal range of motion.   Cardiovascular: Normal rate.   Pulmonary/Chest: Effort normal. No respiratory distress.   Abdominal: Soft.  There is no tenderness.   Musculoskeletal: Normal range of motion. He exhibits no edema.   Neurological: He is alert and oriented to person, place, and time. Coordination normal.   Skin: Skin is warm and dry.   Psychiatric: He has a normal mood and affect. His behavior is normal. Judgment and thought content normal.   Nursing note and vitals reviewed.      Assessment:       1. Post-operative state    2. Ureteral calculus, right    3. Staghorn calculus        Plan:       Francisco was seen today for post-op evaluation.    Diagnoses and all orders for this visit:    Post-operative state    Ureteral calculus, right    Staghorn calculus    Follow-up with Dr. Anne for ureteral stent removal. Removal to be determined by Dr. Anne.     Angella Spivey NP

## 2019-10-04 DIAGNOSIS — N20.0 KIDNEY STONE: Primary | ICD-10-CM

## 2019-10-28 ENCOUNTER — TELEPHONE (OUTPATIENT)
Dept: UROLOGY | Facility: CLINIC | Age: 39
End: 2019-10-28

## 2019-10-28 ENCOUNTER — HOSPITAL ENCOUNTER (OUTPATIENT)
Dept: RADIOLOGY | Facility: HOSPITAL | Age: 39
Discharge: HOME | End: 2019-10-28
Attending: UROLOGY
Payer: MEDICAID

## 2019-10-28 DIAGNOSIS — N20.0 KIDNEY STONE: ICD-10-CM

## 2019-10-28 PROCEDURE — 74018 XR ABDOMEN AP 1 VIEW: ICD-10-PCS | Mod: 26,,, | Performed by: RADIOLOGY

## 2019-10-28 PROCEDURE — 74018 RADEX ABDOMEN 1 VIEW: CPT | Mod: TC,FY,PO

## 2019-10-28 PROCEDURE — 74018 RADEX ABDOMEN 1 VIEW: CPT | Mod: 26,,, | Performed by: RADIOLOGY

## 2019-10-28 NOTE — TELEPHONE ENCOUNTER
----- Message from Mert Anne MD sent at 10/28/2019  3:37 PM CDT -----  Patient needs repeat flexible ureteral pyeloscopy with extraction of stone fragments and dusting of residual stone.  We can schedule for this Thursday.  I will call Esteban and see if he is available.  Check with patient and see if he is able to do this otherwise he will have to wait to get back in town.

## 2019-10-29 ENCOUNTER — OFFICE VISIT (OUTPATIENT)
Dept: UROLOGY | Facility: CLINIC | Age: 39
End: 2019-10-29
Payer: MEDICAID

## 2019-10-29 VITALS
HEIGHT: 65 IN | OXYGEN SATURATION: 98 % | TEMPERATURE: 98 F | WEIGHT: 143 LBS | HEART RATE: 78 BPM | BODY MASS INDEX: 23.82 KG/M2 | SYSTOLIC BLOOD PRESSURE: 122 MMHG | DIASTOLIC BLOOD PRESSURE: 62 MMHG | RESPIRATION RATE: 17 BRPM

## 2019-10-29 DIAGNOSIS — N20.1 RIGHT URETERAL STONE: ICD-10-CM

## 2019-10-29 DIAGNOSIS — N20.0 STAGHORN RENAL CALCULUS: ICD-10-CM

## 2019-10-29 DIAGNOSIS — N23 RENAL COLIC ON RIGHT SIDE: ICD-10-CM

## 2019-10-29 DIAGNOSIS — N20.0 RENAL CALCULUS, RIGHT: Primary | ICD-10-CM

## 2019-10-29 LAB
BACTERIA #/AREA URNS AUTO: ABNORMAL /HPF
BILIRUB UR QL STRIP: NEGATIVE
CAOX CRY UR QL COMP ASSIST: ABNORMAL
CLARITY UR REFRACT.AUTO: ABNORMAL
COLOR UR AUTO: YELLOW
GLUCOSE UR QL STRIP: NEGATIVE
HGB UR QL STRIP: ABNORMAL
HYALINE CASTS UR QL AUTO: 0 /LPF
KETONES UR QL STRIP: NEGATIVE
LEUKOCYTE ESTERASE UR QL STRIP: ABNORMAL
MICROSCOPIC COMMENT: ABNORMAL
NITRITE UR QL STRIP: NEGATIVE
PH UR STRIP: 7 [PH] (ref 5–8)
PROT UR QL STRIP: ABNORMAL
RBC #/AREA URNS AUTO: >100 /HPF (ref 0–4)
SP GR UR STRIP: 1.02 (ref 1–1.03)
SQUAMOUS #/AREA URNS AUTO: 0 /HPF
URN SPEC COLLECT METH UR: ABNORMAL
WBC #/AREA URNS AUTO: 28 /HPF (ref 0–5)

## 2019-10-29 PROCEDURE — 81001 URINALYSIS AUTO W/SCOPE: CPT

## 2019-10-29 PROCEDURE — 99214 OFFICE O/P EST MOD 30 MIN: CPT | Mod: PBBFAC,PO | Performed by: UROLOGY

## 2019-10-29 PROCEDURE — 99999 PR PBB SHADOW E&M-EST. PATIENT-LVL IV: CPT | Mod: PBBFAC,,, | Performed by: UROLOGY

## 2019-10-29 PROCEDURE — 87086 URINE CULTURE/COLONY COUNT: CPT

## 2019-10-29 PROCEDURE — 99999 PR PBB SHADOW E&M-EST. PATIENT-LVL IV: ICD-10-PCS | Mod: PBBFAC,,, | Performed by: UROLOGY

## 2019-10-29 PROCEDURE — 99024 PR POST-OP FOLLOW-UP VISIT: ICD-10-PCS | Mod: S$PBB,,, | Performed by: UROLOGY

## 2019-10-29 PROCEDURE — 99024 POSTOP FOLLOW-UP VISIT: CPT | Mod: S$PBB,,, | Performed by: UROLOGY

## 2019-10-29 RX ORDER — HYDROCODONE BITARTRATE AND ACETAMINOPHEN 5; 325 MG/1; MG/1
1 TABLET ORAL EVERY 6 HOURS PRN
Qty: 8 TABLET | Refills: 0 | Status: SHIPPED | OUTPATIENT
Start: 2019-10-29 | End: 2019-12-20 | Stop reason: ALTCHOICE

## 2019-10-29 RX ORDER — SODIUM CHLORIDE 9 MG/ML
INJECTION, SOLUTION INTRAVENOUS CONTINUOUS
Status: CANCELLED | OUTPATIENT
Start: 2019-10-29

## 2019-10-29 RX ORDER — CIPROFLOXACIN 2 MG/ML
400 INJECTION, SOLUTION INTRAVENOUS
Status: CANCELLED | OUTPATIENT
Start: 2019-10-29

## 2019-10-29 NOTE — PROGRESS NOTES
Subjective:       Patient ID: Francisco Powell is a 39 y.o. male.    Chief Complaint: Post-op Evaluation (iscuss a repeat flexible ureteral pyeloscopy with extraction of stone fragments and dusting of residual stone)    38 yo AAM with Right renal Staghorn calculus. S/P tx with stone fragments lodged around upper stent and in lower pole of kidney.    Flank Pain   This is a new problem. The current episode started more than 1 year ago. The problem occurs constantly. The problem has been gradually worsening since onset. The pain is present in the costovertebral angle. The quality of the pain is described as aching. The pain is at a severity of 9/10. The pain is severe. The pain is the same all the time. Pertinent negatives include no abdominal pain, bladder incontinence, bowel incontinence, chest pain, dysuria, fever, headaches, leg pain, numbness, paresis, paresthesias, pelvic pain, perianal numbness, tingling, weakness or weight loss. Risk factors include renal stones. Treatments tried: Multiple stone treatments. The treatment provided significant relief.     Review of Systems   Constitutional: Negative for activity change, appetite change, chills, diaphoresis, fatigue, fever, unexpected weight change and weight loss.   HENT: Negative for congestion, hearing loss, sinus pressure and trouble swallowing.    Eyes: Negative for photophobia, pain, discharge and visual disturbance.   Respiratory: Negative for apnea, cough and shortness of breath.    Cardiovascular: Negative for chest pain, palpitations and leg swelling.   Gastrointestinal: Negative for abdominal distention, abdominal pain, anal bleeding, blood in stool, bowel incontinence, constipation, diarrhea, nausea, rectal pain and vomiting.   Endocrine: Negative for cold intolerance, heat intolerance, polydipsia, polyphagia and polyuria.   Genitourinary: Positive for flank pain. Negative for bladder incontinence, decreased urine volume, difficulty urinating,  discharge, dysuria, enuresis, frequency, genital sores, hematuria, pelvic pain, penile pain, penile swelling, scrotal swelling, testicular pain and urgency.   Musculoskeletal: Negative for arthralgias, back pain and myalgias.   Skin: Negative for color change, pallor, rash and wound.   Allergic/Immunologic: Negative for environmental allergies, food allergies and immunocompromised state.   Neurological: Negative for dizziness, tingling, seizures, weakness, numbness, headaches and paresthesias.   Hematological: Negative for adenopathy. Does not bruise/bleed easily.   Psychiatric/Behavioral: Negative.        Objective:      Physical Exam   Nursing note and vitals reviewed.  Constitutional: He is oriented to person, place, and time. He appears well-developed and well-nourished.   HENT:   Head: Normocephalic.   Nose: Nose normal.   Mouth/Throat: Oropharynx is clear and moist.   Eyes: Conjunctivae and EOM are normal. Pupils are equal, round, and reactive to light.   Neck: Normal range of motion. Neck supple.   Cardiovascular: Normal rate, regular rhythm, normal heart sounds and intact distal pulses.    Pulmonary/Chest: Effort normal and breath sounds normal.   Abdominal: Soft. Bowel sounds are normal.   Genitourinary: Penis normal.   Musculoskeletal: Normal range of motion.   Neurological: He is alert and oriented to person, place, and time. He has normal reflexes.   Skin: Skin is warm and dry.     Psychiatric: He has a normal mood and affect. His behavior is normal. Judgment and thought content normal.       Assessment:       1. Renal calculus, right    2. Right ureteral stone    3. Renal colic on right side    4. Staghorn renal calculus        Plan:       Patient Instructions   Schedule Right ureteroscopy with laser lithotripsy, stne extraction and stone dusting on 10/31/19

## 2019-10-31 PROBLEM — N20.0 RENAL CALCULUS, RIGHT: Status: ACTIVE | Noted: 2019-10-31

## 2019-10-31 LAB — BACTERIA UR CULT: NO GROWTH

## 2019-11-06 ENCOUNTER — TELEPHONE (OUTPATIENT)
Dept: UROLOGY | Facility: CLINIC | Age: 39
End: 2019-11-06

## 2019-12-11 ENCOUNTER — HOSPITAL ENCOUNTER (EMERGENCY)
Facility: HOSPITAL | Age: 39
Discharge: HOME | End: 2019-12-11
Attending: EMERGENCY MEDICINE
Payer: MEDICAID

## 2019-12-11 VITALS
WEIGHT: 140 LBS | BODY MASS INDEX: 23.9 KG/M2 | OXYGEN SATURATION: 99 % | RESPIRATION RATE: 18 BRPM | HEIGHT: 64 IN | SYSTOLIC BLOOD PRESSURE: 147 MMHG | HEART RATE: 63 BPM | DIASTOLIC BLOOD PRESSURE: 69 MMHG | TEMPERATURE: 99 F

## 2019-12-11 DIAGNOSIS — N50.819 TESTICULAR PAIN: ICD-10-CM

## 2019-12-11 DIAGNOSIS — N20.0 KIDNEY STONE: Primary | ICD-10-CM

## 2019-12-11 LAB
ALBUMIN SERPL BCP-MCNC: 4.2 G/DL (ref 3.5–5.2)
ALP SERPL-CCNC: 61 U/L (ref 55–135)
ALT SERPL W/O P-5'-P-CCNC: 19 U/L (ref 10–44)
ANION GAP SERPL CALC-SCNC: 6 MMOL/L (ref 8–16)
AST SERPL-CCNC: 20 U/L (ref 10–40)
BACTERIA #/AREA URNS HPF: ABNORMAL /HPF
BASOPHILS # BLD AUTO: 0.02 K/UL (ref 0–0.2)
BASOPHILS NFR BLD: 0.3 % (ref 0–1.9)
BILIRUB SERPL-MCNC: 0.4 MG/DL (ref 0.1–1)
BILIRUB UR QL STRIP: NEGATIVE
BUN SERPL-MCNC: 13 MG/DL (ref 6–20)
CALCIUM SERPL-MCNC: 9.9 MG/DL (ref 8.7–10.5)
CHLORIDE SERPL-SCNC: 109 MMOL/L (ref 95–110)
CLARITY UR: ABNORMAL
CO2 SERPL-SCNC: 27 MMOL/L (ref 23–29)
COLOR UR: YELLOW
CREAT SERPL-MCNC: 1.4 MG/DL (ref 0.5–1.4)
DIFFERENTIAL METHOD: ABNORMAL
EOSINOPHIL # BLD AUTO: 0.1 K/UL (ref 0–0.5)
EOSINOPHIL NFR BLD: 1.4 % (ref 0–8)
ERYTHROCYTE [DISTWIDTH] IN BLOOD BY AUTOMATED COUNT: 13.1 % (ref 11.5–14.5)
EST. GFR  (AFRICAN AMERICAN): >60 ML/MIN/1.73 M^2
EST. GFR  (NON AFRICAN AMERICAN): >60 ML/MIN/1.73 M^2
GLUCOSE SERPL-MCNC: 102 MG/DL (ref 70–110)
GLUCOSE UR QL STRIP: NEGATIVE
HCT VFR BLD AUTO: 44.1 % (ref 40–54)
HGB BLD-MCNC: 13.2 G/DL (ref 14–18)
HGB UR QL STRIP: ABNORMAL
HYALINE CASTS #/AREA URNS LPF: 0 /LPF
IMM GRANULOCYTES # BLD AUTO: 0.03 K/UL (ref 0–0.04)
IMM GRANULOCYTES NFR BLD AUTO: 0.4 % (ref 0–0.5)
KETONES UR QL STRIP: NEGATIVE
LEUKOCYTE ESTERASE UR QL STRIP: ABNORMAL
LYMPHOCYTES # BLD AUTO: 1.6 K/UL (ref 1–4.8)
LYMPHOCYTES NFR BLD: 22.3 % (ref 18–48)
MCH RBC QN AUTO: 24.5 PG (ref 27–31)
MCHC RBC AUTO-ENTMCNC: 29.9 G/DL (ref 32–36)
MCV RBC AUTO: 82 FL (ref 82–98)
MICROSCOPIC COMMENT: ABNORMAL
MONOCYTES # BLD AUTO: 0.6 K/UL (ref 0.3–1)
MONOCYTES NFR BLD: 9 % (ref 4–15)
NEUTROPHILS # BLD AUTO: 4.7 K/UL (ref 1.8–7.7)
NEUTROPHILS NFR BLD: 66.6 % (ref 38–73)
NITRITE UR QL STRIP: NEGATIVE
NRBC BLD-RTO: 0 /100 WBC
PH UR STRIP: 7 [PH] (ref 5–8)
PLATELET # BLD AUTO: 215 K/UL (ref 150–350)
PMV BLD AUTO: 9.5 FL (ref 9.2–12.9)
POTASSIUM SERPL-SCNC: 4.5 MMOL/L (ref 3.5–5.1)
PROT SERPL-MCNC: 7.4 G/DL (ref 6–8.4)
PROT UR QL STRIP: ABNORMAL
RBC # BLD AUTO: 5.38 M/UL (ref 4.6–6.2)
RBC #/AREA URNS HPF: >100 /HPF (ref 0–4)
SODIUM SERPL-SCNC: 142 MMOL/L (ref 136–145)
SP GR UR STRIP: 1.01 (ref 1–1.03)
URN SPEC COLLECT METH UR: ABNORMAL
UROBILINOGEN UR STRIP-ACNC: NEGATIVE EU/DL
WBC # BLD AUTO: 7.01 K/UL (ref 3.9–12.7)
WBC #/AREA URNS HPF: >100 /HPF (ref 0–5)

## 2019-12-11 PROCEDURE — 25000003 PHARM REV CODE 250: Performed by: EMERGENCY MEDICINE

## 2019-12-11 PROCEDURE — 87491 CHLMYD TRACH DNA AMP PROBE: CPT

## 2019-12-11 PROCEDURE — 96361 HYDRATE IV INFUSION ADD-ON: CPT

## 2019-12-11 PROCEDURE — 96365 THER/PROPH/DIAG IV INF INIT: CPT

## 2019-12-11 PROCEDURE — 87086 URINE CULTURE/COLONY COUNT: CPT | Mod: 59

## 2019-12-11 PROCEDURE — 63600175 PHARM REV CODE 636 W HCPCS: Performed by: PHYSICIAN ASSISTANT

## 2019-12-11 PROCEDURE — 96376 TX/PRO/DX INJ SAME DRUG ADON: CPT

## 2019-12-11 PROCEDURE — 96375 TX/PRO/DX INJ NEW DRUG ADDON: CPT

## 2019-12-11 PROCEDURE — 87086 URINE CULTURE/COLONY COUNT: CPT

## 2019-12-11 PROCEDURE — 81000 URINALYSIS NONAUTO W/SCOPE: CPT

## 2019-12-11 PROCEDURE — 85025 COMPLETE CBC W/AUTO DIFF WBC: CPT

## 2019-12-11 PROCEDURE — 80053 COMPREHEN METABOLIC PANEL: CPT

## 2019-12-11 PROCEDURE — 99285 EMERGENCY DEPT VISIT HI MDM: CPT | Mod: 25

## 2019-12-11 RX ORDER — HYDROCODONE BITARTRATE AND ACETAMINOPHEN 10; 325 MG/1; MG/1
1 TABLET ORAL EVERY 4 HOURS PRN
Qty: 15 TABLET | Refills: 0 | Status: SHIPPED | OUTPATIENT
Start: 2019-12-11 | End: 2019-12-20 | Stop reason: ALTCHOICE

## 2019-12-11 RX ORDER — MORPHINE SULFATE 10 MG/ML
4 INJECTION INTRAMUSCULAR; INTRAVENOUS; SUBCUTANEOUS
Status: COMPLETED | OUTPATIENT
Start: 2019-12-11 | End: 2019-12-11

## 2019-12-11 RX ORDER — KETOROLAC TROMETHAMINE 10 MG/1
10 TABLET, FILM COATED ORAL EVERY 6 HOURS
Qty: 30 TABLET | Refills: 0 | Status: SHIPPED | OUTPATIENT
Start: 2019-12-11 | End: 2020-01-08 | Stop reason: ALTCHOICE

## 2019-12-11 RX ORDER — KETOROLAC TROMETHAMINE 30 MG/ML
10 INJECTION, SOLUTION INTRAMUSCULAR; INTRAVENOUS
Status: COMPLETED | OUTPATIENT
Start: 2019-12-11 | End: 2019-12-11

## 2019-12-11 RX ORDER — ONDANSETRON 2 MG/ML
4 INJECTION INTRAMUSCULAR; INTRAVENOUS
Status: COMPLETED | OUTPATIENT
Start: 2019-12-11 | End: 2019-12-11

## 2019-12-11 RX ORDER — TAMSULOSIN HYDROCHLORIDE 0.4 MG/1
0.4 CAPSULE ORAL DAILY
Qty: 10 CAPSULE | Refills: 0 | Status: SHIPPED | OUTPATIENT
Start: 2019-12-11 | End: 2020-01-29 | Stop reason: SDUPTHER

## 2019-12-11 RX ORDER — ONDANSETRON 4 MG/1
4 TABLET, ORALLY DISINTEGRATING ORAL EVERY 8 HOURS PRN
Qty: 20 TABLET | Refills: 0 | Status: SHIPPED | OUTPATIENT
Start: 2019-12-11 | End: 2021-04-19

## 2019-12-11 RX ORDER — HYDROCODONE BITARTRATE AND ACETAMINOPHEN 5; 325 MG/1; MG/1
1 TABLET ORAL
Status: COMPLETED | OUTPATIENT
Start: 2019-12-11 | End: 2019-12-11

## 2019-12-11 RX ADMIN — SODIUM CHLORIDE 1000 ML: 0.9 INJECTION, SOLUTION INTRAVENOUS at 11:12

## 2019-12-11 RX ADMIN — ONDANSETRON HYDROCHLORIDE 4 MG: 2 SOLUTION INTRAMUSCULAR; INTRAVENOUS at 01:12

## 2019-12-11 RX ADMIN — MORPHINE SULFATE 4 MG: 10 INJECTION INTRAVENOUS at 01:12

## 2019-12-11 RX ADMIN — KETOROLAC TROMETHAMINE 10 MG: 30 INJECTION, SOLUTION INTRAMUSCULAR; INTRAVENOUS at 11:12

## 2019-12-11 RX ADMIN — CEFTRIAXONE 1 G: 1 INJECTION, SOLUTION INTRAVENOUS at 12:12

## 2019-12-11 RX ADMIN — MORPHINE SULFATE 4 MG: 10 INJECTION INTRAVENOUS at 02:12

## 2019-12-11 RX ADMIN — HYDROCODONE BITARTRATE AND ACETAMINOPHEN 1 TABLET: 5; 325 TABLET ORAL at 04:12

## 2019-12-11 NOTE — ED TRIAGE NOTES
Pt arrived to the ED via POV from home with c/o groin pain. Reports hx of kidney stones and reports blood in urine, difficulty with urinating and dysuria. Rates pain 10/10 on pain scale.

## 2019-12-11 NOTE — DISCHARGE INSTRUCTIONS
Please call Dr. Anne's office to make a follow-up appointment later this week.    Please take new medication as directed. Please make sure to follow up with Dr Anne to discuss today's Emergency Department visit and for further evaluation and management. Please return to the Emergency Department immediately if your symptoms worsen or you develop any additional concerning symptoms.

## 2019-12-11 NOTE — ED PROVIDER NOTES
Encounter Date: 12/11/2019    SCRIBE #1 NOTE: I, Jenn Slaughter, am scribing for, and in the presence of,  Bhupinder Elias PA-C. I have scribed the following portions of the note - Other sections scribed: HPI, ROS, PE.       History     Chief Complaint   Patient presents with    Abdominal Pain     Pt with hx of kidney stones c/o abdominal pain x 3 weeks. Pain now in groin area. Pain worsening today. Had stents placed in October. Denies vomiting      Time of initial assessment: 10:45 AM    CC: Penile Pain    HPI:  This is a 39 y.o. Male, with a PMHx of Kidney Stones, who presents to the Emergency Department with a cc of penile pain x3 weeks. He describes the sensation as an unbearable 10/10, throbbing pain that originates in the head of the penis and radiates to his testicular region. He reports associated difficulty urinating, dysuria, and hematuria x1 day. He also notes diaphoresis but claims it is unrelated. He reports that he has recently been passing calcified stones constantly. He denies decreased urine, any swelling, nausea, vomiting, or penile discharge. He denies a PMHx of DM or Asthma.     Pt notes that he had a stent placement approximately three months ago and is scheduled to have it removed soon.             The history is provided by the patient. No  was used.     Review of patient's allergies indicates:  No Known Allergies  Past Medical History:   Diagnosis Date    Kidney stone 11/28/2018    Staghorn calculus     Urinary tract infection      Past Surgical History:   Procedure Laterality Date    BALLOON DILATION OF URETER Right 12/3/2018    Procedure: DILATION, URETER, USING BALLOON;  Surgeon: Mert Anne MD;  Location: [facility] OR;  Service: Urology;  Laterality: Right;    BALLOON DILATION OF URETER Right 9/26/2019    Procedure: DILATION, URETER, USING BALLOON;  Surgeon: Mert Anne MD;  Location: [facility] OR;  Service: Urology;  Laterality: Right;    CYSTOSCOPY W/  RETROGRADES Right 9/16/2019    Procedure: CYSTOSCOPY, WITH RETROGRADE PYELOGRAM;  Surgeon: Mert Anne MD;  Location: [facility];  Service: Urology;  Laterality: Right;  Cystourethroscopy with right retrograde ureteral pyelogram    CYSTOSCOPY W/ RETROGRADES Right 10/31/2019    Procedure: CYSTOSCOPY, WITH RETROGRADE PYELOGRAM;  Surgeon: Mert Anne MD;  Location: [facility];  Service: Urology;  Laterality: Right;    CYSTOSCOPY W/ URETERAL STENT PLACEMENT  12/3/2018    Procedure: CYSTOSCOPY, WITH URETERAL STENT INSERTION;  Surgeon: Mert Anne MD;  Location: [facility] OR;  Service: Urology;;    CYSTOSCOPY W/ URETERAL STENT PLACEMENT Right 10/31/2019    Procedure: CYSTOSCOPY, WITH URETERAL STENT INSERTION;  Surgeon: Mert Anne MD;  Location: [facility] OR;  Service: Urology;  Laterality: Right;    CYSTOSCOPY W/ URETERAL STENT REMOVAL Right 10/31/2019    Procedure: CYSTOSCOPY, WITH URETERAL STENT REMOVAL;  Surgeon: Mert Anne MD;  Location: [facility];  Service: Urology;  Laterality: Right;    CYSTOSCOPY WITH URETEROSCOPY, RETROGRADE PYELOGRAPHY, AND INSERTION OF STENT Right 9/26/2019    Procedure: CYSTOSCOPY, WITH RETROGRADE PYELOGRAM AND URETERAL STENT INSERTION;  Surgeon: Mert Anne MD;  Location: [facility];  Service: Urology;  Laterality: Right;    EXTRACORPOREAL SHOCK WAVE LITHOTRIPSY Right 12/6/2018    Procedure: LITHOTRIPSY, ESWL;  Surgeon: Mert Anne MD;  Location: [facility];  Service: Urology;  Laterality: Right;    EXTRACORPOREAL SHOCK WAVE LITHOTRIPSY Right 9/19/2019    Procedure: LITHOTRIPSY, ESWL;  Surgeon: Mert Anne MD;  Location: [facility] OR;  Service: Urology;  Laterality: Right;    LASER LITHOTRIPSY Right 12/3/2018    Procedure: LITHOTRIPSY, USING LASER;  Surgeon: Mert Anne MD;  Location: [facility] OR;  Service: Urology;  Laterality: Right;    LASER LITHOTRIPSY Right 9/16/2019    Procedure: LITHOTRIPSY, USING LASER;  Surgeon: Mert Anne MD;  Location: [facility];   "Service: Urology;  Laterality: Right;  attempted    LASER LITHOTRIPSY Right 9/26/2019    Procedure: LITHOTRIPSY, USING LASER;  Surgeon: Mert Anne MD;  Location: [facility];  Service: Urology;  Laterality: Right;    LASER LITHOTRIPSY Right 10/31/2019    Procedure: LITHOTRIPSY, USING LASER;  Surgeon: Mert Anne MD;  Location: [facility];  Service: Urology;  Laterality: Right;    TONSILLECTOMY      URETEROPYELOSCOPY Right 10/31/2019    Procedure: URETEROPYELOSCOPY;  Surgeon: Mert Anne MD;  Location: [facility];  Service: Urology;  Laterality: Right;    URETEROSCOPIC REMOVAL OF URETERIC CALCULUS Right 9/16/2019    Procedure: REMOVAL, CALCULUS, URETER, URETEROSCOPIC;  Surgeon: Mert Anne MD;  Location: [facility];  Service: Urology;  Laterality: Right;    URETEROSCOPIC REMOVAL OF URETERIC CALCULUS Right 9/26/2019    Procedure: REMOVAL, CALCULUS, URETER, URETEROSCOPIC;  Surgeon: Mert Anne MD;  Location: [facility];  Service: Urology;  Laterality: Right;    URETEROSCOPY Right 12/3/2018    Procedure: URETEROSCOPY;  Surgeon: Mert Anne MD;  Location: [facility];  Service: Urology;  Laterality: Right;    URETEROSCOPY Right 9/16/2019    Procedure: URETEROSCOPY;  Surgeon: Mert Anne MD;  Location: [facility];  Service: Urology;  Laterality: Right;  right semi rigid ureteroscopy, right flexible ureteroscopy    URETEROSCOPY Right 9/26/2019    Procedure: URETEROSCOPY;  Surgeon: Mert Anne MD;  Location: [facility];  Service: Urology;  Laterality: Right;  Need [facility]ING L[facility]ER" from [facility]      URETEROSCOPY Right 10/31/2019    Procedure: URETEROSCOPY;  Surgeon: Mert Anne MD;  Location: [facility];  Service: Urology;  Laterality: Right;     Family History   Problem Relation Age of Onset    Prostate cancer Neg Hx     Kidney disease Neg Hx      Social History     Tobacco Use    Smoking status: Current Every Day Smoker     Packs/day: 1.00     Years: 17.00     Pack years: 17.00     Types: " Cigarettes    Smokeless tobacco: Never Used    Tobacco comment: smoking cessation education packet given & discussed   Substance Use Topics    Alcohol use: Yes     Frequency: Never     Comment: occasional    Drug use: Yes     Types: Marijuana     Comment: every day last smoke yesterday at 1400 (tuesday)     Review of Systems   Constitutional: Positive for diaphoresis (unrelated). Negative for chills and fever.   HENT: Negative for sore throat.    Eyes: Negative for visual disturbance.   Respiratory: Negative for shortness of breath.    Cardiovascular: Negative for chest pain.   Gastrointestinal: Negative for diarrhea, nausea and vomiting.   Genitourinary: Positive for difficulty urinating, dysuria, hematuria, penile pain and testicular pain. Negative for decreased urine volume, discharge, penile swelling and scrotal swelling.   Musculoskeletal: Negative for neck pain.   Skin: Negative for rash.   Allergic/Immunologic: Negative for immunocompromised state.   Neurological: Negative for headaches.   Psychiatric/Behavioral: Negative for dysphoric mood.       Physical Exam     Initial Vitals [12/11/19 0950]   BP Pulse Resp Temp SpO2   (!) 157/92 82 18 97.9 °F (36.6 °C) 100 %      MAP       --         Physical Exam    Nursing note and vitals reviewed.  Constitutional: He appears well-developed and well-nourished. He is cooperative.  Non-toxic appearance. He does not appear ill. No distress.   HENT:   Head: Normocephalic and atraumatic.   Eyes: EOM are normal.   Neck: Normal range of motion. Neck supple.   Cardiovascular: Normal rate, regular rhythm, normal heart sounds and intact distal pulses.   Pulmonary/Chest: Effort normal and breath sounds normal. No respiratory distress. He has no wheezes.   Abdominal: Soft. Bowel sounds are normal. There is no tenderness. There is no rigidity, no rebound, no guarding and no CVA tenderness.   Genitourinary: Testes normal. Cremasteric reflex is present. Right testis shows no mass  and no tenderness. Left testis shows no mass and no tenderness.   Musculoskeletal: Normal range of motion.   Neurological: He is alert and oriented to person, place, and time. He has normal strength. No cranial nerve deficit or sensory deficit. Coordination and gait normal.   Skin: Skin is warm and dry. Capillary refill takes less than 2 seconds. No rash noted.   Psychiatric: He has a normal mood and affect.         ED Course   Procedures  Labs Reviewed   CBC W/ AUTO DIFFERENTIAL - Abnormal; Notable for the following components:       Result Value    Hemoglobin 13.2 (*)     Mean Corpuscular Hemoglobin 24.5 (*)     Mean Corpuscular Hemoglobin Conc 29.9 (*)     All other components within normal limits   COMPREHENSIVE METABOLIC PANEL - Abnormal; Notable for the following components:    Anion Gap 6 (*)     All other components within normal limits   URINALYSIS, REFLEX TO URINE CULTURE - Abnormal; Notable for the following components:    Appearance, UA Hazy (*)     Protein, UA 2+ (*)     Occult Blood UA 3+ (*)     Leukocytes, UA 3+ (*)     All other components within normal limits    Narrative:     Preferred Collection Type->Urine, Clean Catch   URINALYSIS MICROSCOPIC - Abnormal; Notable for the following components:    RBC, UA >100 (*)     WBC, UA >100 (*)     All other components within normal limits    Narrative:     Preferred Collection Type->Urine, Clean Catch   C. TRACHOMATIS/N. GONORRHOEAE BY AMP DNA   C. TRACHOMATIS/N. GONORRHOEAE BY AMP DNA    Narrative:     Preferred Collection Type->Urine, Clean Catch   CULTURE, URINE    Narrative:     Preferred Collection Type->Urine, Clean Catch   CULTURE, URINE    Narrative:     Indicated criteria for high risk culture:->Prior to urologic  procedures   CULTURE, URINE          Imaging Results           CT Renal Stone Study ABD Pelvis WO (Final result)  Result time 12/11/19 15:35:34    Final result by Hermes Mendiola MD (12/11/19 15:35:34)                 Impression:       This report was flagged in Epic as abnormal.    1. There is mild right hydronephrosis with perinephric and periureteral inflammation.  The inflammatory changes have increased since the previous examination noting clusters of calculi along the course of the right ureteral stent, some of which appear to have migrated distally since the previous examination.  Correlation with urinalysis advised.  2. There is additional right nonobstructive nephrolithiasis.  3. Additional findings above.      Electronically signed by: Hermes Mendiola MD  Date:    12/11/2019  Time:    15:35             Narrative:    EXAMINATION:  CT RENAL STONE STUDY ABD PELVIS WO    CLINICAL HISTORY:  Flank pain, recurrent stone disease suspected;    TECHNIQUE:  Low dose axial images, sagittal and coronal reformations were obtained from the lung bases to the pubic symphysis.  Contrast was not administered.    COMPARISON:  CT 08/18/2019, ultrasound 12/11/2019    FINDINGS:  Images of the lower thorax are remarkable for minimal bilateral dependent atelectasis.    The spleen, gallbladder and adrenal glands are grossly unremarkable.  There are scattered subcentimeter low attenuating lesions within the hepatic parenchyma, all of which too small for characterization.  Within the right hepatic lobe, there is a 1.1 cm low attenuating lesion, attenuation of which suggests cyst.  The liver is enlarged.  There is some indistinctness about the pancreas, nonspecific, may be related to motion artifact, correlation with pancreatic enzymes as warranted.  The pancreatic duct is not dilated.  The stomach is relatively decompressed.  No significant abdominal lymphadenopathy.    The left kidney is grossly unremarkable for noncontrast technique without hydronephrosis or nephrolithiasis.  The left ureter is unremarkable without definite calculi seen noting the ureter cannot be followed in its entirety to the urinary bladder.  There is mild right hydroureteronephrosis and  right nonobstructive nephrolithiasis.  There is right perinephric fat stranding and periureteral fat stranding.  There is a right ureteral stent, appears stable in positioning as compared to the previous exam.  Calculi are noted along the course of the stent, 1 of which along the proximal aspect of the stent measures approximately 4-5 mm, previously larger.  Additionally, along the distal aspect of the stent, there are a few punctate foci of calcification, that appear to have migrated distally since the previous examination, possibly accounting for decreased size of the proximal more calculus.  The urinary bladder is grossly unremarkable.    The large bowel is grossly unremarkable noting a few scattered colonic diverticula without inflammation.  The terminal ileum and appendix are unremarkable.  The small bowel is grossly unremarkable.  No focal organized pelvic fluid collection.    No focal osseous destructive process.  No significant inguinal lymphadenopathy.                               US Retroperitoneal Complete (Final result)  Result time 12/11/19 13:22:40   Procedure changed from US Retroperitoneal Limited     Final result by Cornelio Conklin DO (12/11/19 13:22:40)                 Impression:      1. Moderate right hydronephrosis.  2. Two right-sided renal stones, measuring up to 5 mm.      Electronically signed by: Cornelio Conklin  Date:    12/11/2019  Time:    13:22             Narrative:    EXAMINATION:  US RETROPERITONEAL COMPLETE    CLINICAL HISTORY:  kidney stone; Calculus of kidney    TECHNIQUE:  Ultrasound of the kidneys and urinary bladder was performed including color flow and Doppler evaluation of the kidneys.    COMPARISON:  None available.  Correlation is made to CT of the abdomen and pelvis from 08/18/2019.    FINDINGS:  Right kidney: The right kidney measures 10.5 cm.  Interval removal of the ureteral stent.  No cortical thinning. No loss of corticomedullary distinction. Resistive index  measures 0.61.  No mass. There is moderate right hydronephrosis.  There are 2 hyperechoic stones in the right midpole, measuring 5 mm and 3 mm.    Left kidney: The left kidney measures 9.8 cm. No cortical thinning. No loss of corticomedullary distinction. Resistive index measures 0.58.  No mass. No renal stone. No hydronephrosis.    The urinary bladder is partially distended at the time of scanning and has an unremarkable appearance.  Bilateral ureteral jets are noted.                               US Scrotum And Testicles (Final result)  Result time 12/11/19 13:16:24    Final result by Conrelio Conklin DO (12/11/19 13:16:24)                 Impression:      No sonographic abnormality.      Electronically signed by: Cornelio Conklin  Date:    12/11/2019  Time:    13:16             Narrative:    EXAMINATION:  US SCROTUM AND TESTICLES    CLINICAL HISTORY:  Testicular pain, unspecified    TECHNIQUE:  Sonography of the scrotum and testes.    COMPARISON:  None.    FINDINGS:  Right Testicle:    *Size: 4.2 x 2.2 x 3.8 cm  *Appearance: Normal.  *Flow: Normal arterial and venous flow  *Epididymis: Normal.  *Hydrocele: None.  *Varicocele: None.  Left Testicle:    *Size: 3.0 x 2.2 x 4.2 cm  *Appearance: Normal.  *Flow: Normal arterial and venous flow  *Epididymis: Normal.  *Hydrocele: None.  *Varicocele: None.  Other findings: None.                                 Medical Decision Making:   Clinical Tests:   Lab Tests: Ordered and Reviewed  Radiological Study: Ordered and Reviewed  ED Management:  Hemodynamically stable. Non-toxic and in no acute distress. UA reveals 3+ leuks and > 100 WBCs and RBCs. Rocephin given in ED. CT read reports  mild right hydronephrosis with perinephric and periureteral inflammation.  The inflammatory changes have increased since the previous examination noting clusters of calculi along the course of the right ureteral stent, some of which appear to have migrated distally since the previous  examination. Discussed patient with Dr Gonzalez with Urology on call who reviewed labs and imaging and recommend patient follow up closely with Dr Anne for further treatment and management. Do not recommend discharging with antibiotics. Will d/c home with pain meds, zofran and strict return instructions given. Pt verbalizes understanding and is agreeable with plan.             Scribe Attestation:   Scribe #1: I performed the above scribed service and the documentation accurately describes the services I performed. I attest to the accuracy of the note.                          Clinical Impression:     1. Kidney stone    2. Testicular pain          Disposition:   Disposition: Discharged  Condition: Stable    Scribe attestation: I, Bhupinder Elias, personally performed the services described in this documentation. All medical record entries made by the scribe were at my direction and in my presence.  I have reviewed the chart and agree that the record reflects my personal performance and is accurate and complete.                 Bhupinder Elias, [place]  12/13/19 [id number]

## 2019-12-12 LAB
C TRACH DNA SPEC QL NAA+PROBE: NOT DETECTED
N GONORRHOEA DNA SPEC QL NAA+PROBE: NOT DETECTED

## 2019-12-13 LAB
BACTERIA UR CULT: NO GROWTH
BACTERIA UR CULT: NORMAL

## 2019-12-16 ENCOUNTER — TELEPHONE (OUTPATIENT)
Dept: UROLOGY | Facility: CLINIC | Age: 39
End: 2019-12-16

## 2019-12-16 NOTE — TELEPHONE ENCOUNTER
----- Message from Aileen Wang sent at 12/16/2019  1:42 PM CST -----  Contact: pt  Pt would like to be called back regarding  Medication refill and removing cap  Pt can be reached at [phone]

## 2019-12-17 ENCOUNTER — OFFICE VISIT (OUTPATIENT)
Dept: UROLOGY | Facility: CLINIC | Age: 39
End: 2019-12-17
Payer: MEDICAID

## 2019-12-17 VITALS
HEART RATE: 83 BPM | HEIGHT: 64 IN | DIASTOLIC BLOOD PRESSURE: 90 MMHG | BODY MASS INDEX: 23.9 KG/M2 | TEMPERATURE: 99 F | SYSTOLIC BLOOD PRESSURE: 133 MMHG | WEIGHT: 140 LBS | OXYGEN SATURATION: 99 % | RESPIRATION RATE: 19 BRPM

## 2019-12-17 DIAGNOSIS — R30.0 DYSURIA: ICD-10-CM

## 2019-12-17 DIAGNOSIS — N20.0 KIDNEY STONE: Primary | ICD-10-CM

## 2019-12-17 DIAGNOSIS — N20.0 STAGHORN CALCULUS: ICD-10-CM

## 2019-12-17 DIAGNOSIS — N23 RENAL COLIC ON RIGHT SIDE: ICD-10-CM

## 2019-12-17 LAB
BACTERIA #/AREA URNS AUTO: ABNORMAL /HPF
BILIRUB UR QL STRIP: NEGATIVE
CLARITY UR REFRACT.AUTO: ABNORMAL
COLOR UR AUTO: YELLOW
GLUCOSE UR QL STRIP: NEGATIVE
HGB UR QL STRIP: ABNORMAL
HYALINE CASTS UR QL AUTO: 0 /LPF
KETONES UR QL STRIP: NEGATIVE
LEUKOCYTE ESTERASE UR QL STRIP: ABNORMAL
MICROSCOPIC COMMENT: ABNORMAL
NITRITE UR QL STRIP: NEGATIVE
PH UR STRIP: 7 [PH] (ref 5–8)
PROT UR QL STRIP: ABNORMAL
RBC #/AREA URNS AUTO: >100 /HPF (ref 0–4)
SP GR UR STRIP: 1.01 (ref 1–1.03)
SQUAMOUS #/AREA URNS AUTO: 0 /HPF
URN SPEC COLLECT METH UR: ABNORMAL
WBC #/AREA URNS AUTO: 16 /HPF (ref 0–5)

## 2019-12-17 PROCEDURE — 82365 CALCULUS SPECTROSCOPY: CPT

## 2019-12-17 PROCEDURE — 99999 PR PBB SHADOW E&M-EST. PATIENT-LVL IV: CPT | Mod: PBBFAC,,, | Performed by: NURSE PRACTITIONER

## 2019-12-17 PROCEDURE — 99214 OFFICE O/P EST MOD 30 MIN: CPT | Mod: PBBFAC,PO | Performed by: NURSE PRACTITIONER

## 2019-12-17 PROCEDURE — 99999 PR PBB SHADOW E&M-EST. PATIENT-LVL IV: ICD-10-PCS | Mod: PBBFAC,,, | Performed by: NURSE PRACTITIONER

## 2019-12-17 PROCEDURE — 99024 POSTOP FOLLOW-UP VISIT: CPT | Mod: S$PBB,,, | Performed by: NURSE PRACTITIONER

## 2019-12-17 PROCEDURE — 81001 URINALYSIS AUTO W/SCOPE: CPT

## 2019-12-17 PROCEDURE — 87086 URINE CULTURE/COLONY COUNT: CPT

## 2019-12-17 PROCEDURE — 99024 PR POST-OP FOLLOW-UP VISIT: ICD-10-PCS | Mod: S$PBB,,, | Performed by: NURSE PRACTITIONER

## 2019-12-17 RX ORDER — PHENAZOPYRIDINE HYDROCHLORIDE 100 MG/1
100 TABLET, FILM COATED ORAL 3 TIMES DAILY PRN
Qty: 15 TABLET | Refills: 0 | Status: SHIPPED | OUTPATIENT
Start: 2019-12-17 | End: 2019-12-22

## 2019-12-17 RX ORDER — SODIUM CHLORIDE 9 MG/ML
INJECTION, SOLUTION INTRAVENOUS CONTINUOUS
Status: CANCELLED | OUTPATIENT
Start: 2019-12-17

## 2019-12-17 RX ORDER — CIPROFLOXACIN 2 MG/ML
400 INJECTION, SOLUTION INTRAVENOUS
Status: CANCELLED | OUTPATIENT
Start: 2019-12-17

## 2019-12-17 NOTE — PROGRESS NOTES
Subjective:       Patient ID: Francisco Powell is a 39 y.o. male.    Chief Complaint: Nephrolithiasis and Flank Pain    Patient is here today with c/o right testicle pain and penile pain. He reports passing stone particles and brought some in today for analysis. Patient currently has a right ureteral stent. He was seen in the ER on 12/11/19 and CT was performed at that time. Results discussed with patient.   Dysuria    This is a chronic problem. The current episode started more than 1 month ago. The problem occurs every urination. The problem has been unchanged. The quality of the pain is described as burning. The pain is moderate. He is sexually active. There is no history of pyelonephritis. Associated symptoms include frequency and urgency. Pertinent negatives include no behavior changes, chills, discharge, flank pain, hematuria, hesitancy, nausea, sweats, vomiting, weight loss, bubble bath use, constipation, rash or withholding. He has tried NSAIDs for the symptoms. The treatment provided mild relief. His past medical history is significant for kidney stones and a urological procedure. There is no history of diabetes mellitus, hypertension, recurrent UTIs or a single kidney.     Review of Systems   Constitutional: Negative for appetite change, chills, fatigue, fever and weight loss.   Gastrointestinal: Negative for abdominal pain, constipation, diarrhea, nausea and vomiting.   Genitourinary: Positive for dysuria, frequency, penile pain (tip), testicular pain (right) and urgency. Negative for decreased urine volume, difficulty urinating, discharge, flank pain, hematuria, hesitancy, penile swelling and scrotal swelling.   Skin: Negative for rash.   Psychiatric/Behavioral: Negative.        Objective:      Physical Exam   Constitutional: He is oriented to person, place, and time. He appears well-developed and well-nourished. No distress.   HENT:   Head: Normocephalic and atraumatic.   Eyes: Pupils are equal, round,  and reactive to light. EOM are normal.   Neck: Normal range of motion.   Cardiovascular: Normal rate.   Pulmonary/Chest: Effort normal. No respiratory distress.   Abdominal: Soft. There is no tenderness.   Musculoskeletal: Normal range of motion. He exhibits no edema.   Neurological: He is alert and oriented to person, place, and time. Coordination normal.   Skin: Skin is warm and dry.   Psychiatric: He has a normal mood and affect. His behavior is normal. Judgment and thought content normal.   Nursing note and vitals reviewed.      Assessment:       1. Kidney stone    2. Staghorn calculus    3. Renal colic on right side    4. Dysuria        Plan:       Francisco was seen today for nephrolithiasis and flank pain.    Diagnoses and all orders for this visit:    Kidney stone  -     Urinalysis  -     Urine culture  -     Urinary Stone Analysis    Staghorn calculus  -     Urinalysis  -     Urine culture  -     Urinary Stone Analysis    Renal colic on right side  -     Urinalysis  -     Urine culture    Dysuria  -     phenazopyridine (PYRIDIUM) 100 MG tablet; Take 1 tablet (100 mg total) by mouth 3 (three) times daily as needed for Pain.  -     Urinalysis  -     Urine culture    Other order  1. Schedule ureteroscopy with possible right ureteral stent removal by Dr. Anne on Monday, 12/23/19.    Follow-up post-up.    Angella Spivey NP

## 2019-12-18 LAB — BACTERIA UR CULT: NO GROWTH

## 2019-12-19 LAB
COMPN STONE: NORMAL
SPECIMEN SOURCE: NORMAL
STONE ANALYSIS IR-IMP: NORMAL

## 2020-01-08 ENCOUNTER — OFFICE VISIT (OUTPATIENT)
Dept: UROLOGY | Facility: CLINIC | Age: 40
End: 2020-01-08
Payer: MEDICAID

## 2020-01-08 VITALS
RESPIRATION RATE: 18 BRPM | HEART RATE: 75 BPM | DIASTOLIC BLOOD PRESSURE: 77 MMHG | WEIGHT: 140 LBS | HEIGHT: 64 IN | OXYGEN SATURATION: 98 % | SYSTOLIC BLOOD PRESSURE: 130 MMHG | TEMPERATURE: 99 F | BODY MASS INDEX: 23.9 KG/M2

## 2020-01-08 DIAGNOSIS — R10.2 PERINEUM PAIN, MALE: ICD-10-CM

## 2020-01-08 DIAGNOSIS — Z98.890 POST-OPERATIVE STATE: Primary | ICD-10-CM

## 2020-01-08 DIAGNOSIS — N23 RENAL COLIC ON RIGHT SIDE: ICD-10-CM

## 2020-01-08 DIAGNOSIS — N20.1 RIGHT URETERAL STONE: ICD-10-CM

## 2020-01-08 LAB
BILIRUB SERPL-MCNC: NORMAL MG/DL
BLOOD URINE, POC: NORMAL
COLOR, POC UA: YELLOW
GLUCOSE UR QL STRIP: NORMAL
KETONES UR QL STRIP: NORMAL
LEUKOCYTE ESTERASE URINE, POC: NORMAL
NITRITE, POC UA: NORMAL
PH, POC UA: 7
PROTEIN, POC: 300
SPECIFIC GRAVITY, POC UA: 1.02
UROBILINOGEN, POC UA: 1

## 2020-01-08 PROCEDURE — 99024 PR POST-OP FOLLOW-UP VISIT: ICD-10-PCS | Mod: ,,, | Performed by: NURSE PRACTITIONER

## 2020-01-08 PROCEDURE — 99214 OFFICE O/P EST MOD 30 MIN: CPT | Mod: PBBFAC,PO | Performed by: NURSE PRACTITIONER

## 2020-01-08 PROCEDURE — 99999 PR PBB SHADOW E&M-EST. PATIENT-LVL IV: CPT | Mod: PBBFAC,,, | Performed by: NURSE PRACTITIONER

## 2020-01-08 PROCEDURE — 87086 URINE CULTURE/COLONY COUNT: CPT

## 2020-01-08 PROCEDURE — 99024 POSTOP FOLLOW-UP VISIT: CPT | Mod: ,,, | Performed by: NURSE PRACTITIONER

## 2020-01-08 PROCEDURE — 81002 URINALYSIS NONAUTO W/O SCOPE: CPT | Mod: PBBFAC,PO | Performed by: NURSE PRACTITIONER

## 2020-01-08 PROCEDURE — 99999 PR PBB SHADOW E&M-EST. PATIENT-LVL IV: ICD-10-PCS | Mod: PBBFAC,,, | Performed by: NURSE PRACTITIONER

## 2020-01-08 RX ORDER — NAPROXEN 500 MG/1
500 TABLET ORAL 2 TIMES DAILY
Qty: 28 TABLET | Refills: 0 | Status: SHIPPED | OUTPATIENT
Start: 2020-01-08 | End: 2020-01-22

## 2020-01-08 RX ORDER — SULFAMETHOXAZOLE AND TRIMETHOPRIM 800; 160 MG/1; MG/1
1 TABLET ORAL 2 TIMES DAILY
Qty: 28 TABLET | Refills: 0 | Status: SHIPPED | OUTPATIENT
Start: 2020-01-08 | End: 2020-01-22

## 2020-01-08 NOTE — PROGRESS NOTES
Subjective:       Patient ID: Francisco Powell is a 39 y.o. male.    Chief Complaint: Post-op Evaluation (litho - still having severe pain , out of pain meds) and Results (kub done yesterday)    Patient is here today for his post-op evaluation. He is S/P right ureteroscopy with right ureteral stent removal and replacement by Dr. Anne on 12/23/19. Patient's only complaint is perineum pain, which he rates a 9/10 on pain scale. He denies flank pain, abdominal pain, groin pain, or testicular pain.     Other   Chronicity: S/P right ureteroscopy. Episode onset: 12/23/19. Associated symptoms include nausea and urinary symptoms. Pertinent negatives include no abdominal pain, anorexia, arthralgias, change in bowel habit, chills, diaphoresis, fatigue, fever, headaches, swollen glands, vomiting or weakness. Exacerbated by: sitting due to perineum pain  He has tried oral narcotics for the symptoms. The treatment provided mild relief.     Review of Systems   Constitutional: Negative for appetite change, chills, diaphoresis, fatigue and fever.   Gastrointestinal: Positive for nausea. Negative for abdominal pain, anorexia, change in bowel habit, constipation, diarrhea and vomiting.   Genitourinary: Positive for dysuria. Negative for decreased urine volume, difficulty urinating, discharge, flank pain, frequency, hematuria, penile pain, penile swelling, scrotal swelling, testicular pain and urgency.        Perineum pain 9/10   Musculoskeletal: Negative for arthralgias.   Neurological: Negative for dizziness, weakness and headaches.   Psychiatric/Behavioral: Negative.        Objective:      Physical Exam   Constitutional: He is oriented to person, place, and time. He appears well-developed and well-nourished. No distress.   HENT:   Head: Normocephalic and atraumatic.   Eyes: Pupils are equal, round, and reactive to light. EOM are normal.   Neck: Normal range of motion.   Cardiovascular: Normal rate.   Pulmonary/Chest: Effort normal.  No respiratory distress.   Abdominal: Soft. There is no tenderness.   Musculoskeletal: Normal range of motion. He exhibits no edema.   Neurological: He is alert and oriented to person, place, and time. Coordination normal.   Skin: Skin is warm and dry.   Psychiatric: He has a normal mood and affect. His behavior is normal. Judgment and thought content normal.   Nursing note and vitals reviewed.      Assessment:       1. Post-operative state    2. Right ureteral stone    3. Renal colic on right side    4. Perineum pain, male        Plan:       Francisco was seen today for post-op evaluation and results.    Diagnoses and all orders for this visit:    Post-operative state  -     POCT URINE DIPSTICK WITHOUT MICROSCOPE  -     Urine culture    Right ureteral stone  -     POCT URINE DIPSTICK WITHOUT MICROSCOPE  -     Urine culture    Renal colic on right side  -     POCT URINE DIPSTICK WITHOUT MICROSCOPE  -     Urine culture    Perineum pain, male  -     sulfamethoxazole-trimethoprim 800-160mg (BACTRIM DS) 800-160 mg Tab; Take 1 tablet by mouth 2 (two) times daily. for 14 days  -     naproxen (NAPROSYN) 500 MG tablet; Take 1 tablet (500 mg total) by mouth 2 (two) times daily. for 14 days  -     POCT URINE DIPSTICK WITHOUT MICROSCOPE  -     Urine culture    Follow-up in 4 weeks.    Angella Spivey NP

## 2020-01-09 LAB — BACTERIA UR CULT: NO GROWTH

## 2020-01-28 DIAGNOSIS — N20.0 KIDNEY STONE: Primary | ICD-10-CM

## 2020-01-28 RX ORDER — SODIUM CHLORIDE 9 MG/ML
INJECTION, SOLUTION INTRAVENOUS CONTINUOUS
Status: CANCELLED | OUTPATIENT
Start: 2020-01-28

## 2020-01-28 RX ORDER — CIPROFLOXACIN 2 MG/ML
400 INJECTION, SOLUTION INTRAVENOUS
Status: CANCELLED | OUTPATIENT
Start: 2020-01-28

## 2020-02-03 DIAGNOSIS — N20.0 KIDNEY STONE: Primary | ICD-10-CM

## 2020-02-05 DIAGNOSIS — N20.0 KIDNEY STONE: Primary | ICD-10-CM

## 2020-02-05 DIAGNOSIS — Z98.890 POST-OPERATIVE STATE: ICD-10-CM

## 2020-02-17 ENCOUNTER — OFFICE VISIT (OUTPATIENT)
Dept: UROLOGY | Facility: CLINIC | Age: 40
End: 2020-02-17
Payer: MEDICAID

## 2020-02-17 VITALS
DIASTOLIC BLOOD PRESSURE: 89 MMHG | BODY MASS INDEX: 23.32 KG/M2 | WEIGHT: 140 LBS | HEIGHT: 65 IN | RESPIRATION RATE: 18 BRPM | TEMPERATURE: 99 F | HEART RATE: 75 BPM | OXYGEN SATURATION: 98 % | SYSTOLIC BLOOD PRESSURE: 141 MMHG

## 2020-02-17 DIAGNOSIS — R30.0 DYSURIA: ICD-10-CM

## 2020-02-17 DIAGNOSIS — N20.1 RIGHT URETERAL STONE: ICD-10-CM

## 2020-02-17 DIAGNOSIS — N23 RENAL COLIC ON RIGHT SIDE: ICD-10-CM

## 2020-02-17 DIAGNOSIS — Z98.890 POST-OPERATIVE STATE: Primary | ICD-10-CM

## 2020-02-17 PROCEDURE — 99999 PR PBB SHADOW E&M-EST. PATIENT-LVL IV: ICD-10-PCS | Mod: PBBFAC,,, | Performed by: NURSE PRACTITIONER

## 2020-02-17 PROCEDURE — 99024 PR POST-OP FOLLOW-UP VISIT: ICD-10-PCS | Mod: ,,, | Performed by: NURSE PRACTITIONER

## 2020-02-17 PROCEDURE — 99024 POSTOP FOLLOW-UP VISIT: CPT | Mod: ,,, | Performed by: NURSE PRACTITIONER

## 2020-02-17 PROCEDURE — 99214 OFFICE O/P EST MOD 30 MIN: CPT | Mod: PBBFAC,PO | Performed by: NURSE PRACTITIONER

## 2020-02-17 PROCEDURE — 99999 PR PBB SHADOW E&M-EST. PATIENT-LVL IV: CPT | Mod: PBBFAC,,, | Performed by: NURSE PRACTITIONER

## 2020-02-17 NOTE — PROGRESS NOTES
Subjective:       Patient ID: Francisco Powell is a 39 y.o. male.    Chief Complaint: Post-op Evaluation and Results (xray done today)    Patient is here today for his post-op evaluation. He is S/P right ureteral ESWL by Dr. Anne on 1/30/2020. Post-op KUB performed today, but results still pending. Patient reports right flank pain, which he rates a 6/10 on pain scale. He reports his pain is improving compared to yesterday.     Other   This is a chronic (S/P right ureteral ESWL) problem. Episode onset: sx on 1/30/2020. The problem occurs daily. The problem has been gradually improving. Associated symptoms include urinary symptoms. Pertinent negatives include no abdominal pain, anorexia, arthralgias, change in bowel habit, chills, diaphoresis, fatigue, fever, headaches, nausea, swollen glands, vomiting or weakness. Nothing aggravates the symptoms. He has tried oral narcotics for the symptoms. The treatment provided moderate relief.     Review of Systems   Constitutional: Negative for appetite change, chills, diaphoresis, fatigue and fever.   Gastrointestinal: Negative for abdominal pain, anorexia, change in bowel habit, constipation, diarrhea, nausea and vomiting.   Genitourinary: Positive for dysuria, flank pain (right) and frequency. Negative for decreased urine volume, difficulty urinating, hematuria, penile pain, penile swelling, scrotal swelling, testicular pain and urgency.   Musculoskeletal: Negative for arthralgias.   Neurological: Negative for dizziness, weakness and headaches.   Psychiatric/Behavioral: Negative.        Objective:      Physical Exam   Constitutional: He is oriented to person, place, and time. He appears well-developed and well-nourished. No distress.   HENT:   Head: Normocephalic and atraumatic.   Eyes: Pupils are equal, round, and reactive to light. EOM are normal.   Neck: Normal range of motion.   Cardiovascular: Normal rate.   Pulmonary/Chest: Effort normal. No respiratory distress.    Abdominal: Soft. There is no tenderness.   Musculoskeletal: Normal range of motion. He exhibits no edema.   Neurological: He is alert and oriented to person, place, and time. Coordination normal.   Skin: Skin is warm and dry.   Psychiatric: He has a normal mood and affect. His behavior is normal. Judgment and thought content normal.   Nursing note and vitals reviewed.      Assessment:       1. Post-operative state    2. Right ureteral stone    3. Renal colic on right side    4. Dysuria        Plan:       Francisco was seen today for post-op evaluation and results.    Diagnoses and all orders for this visit:    Post-operative state    Right ureteral stone    Renal colic on right side    Dysuria    Follow-up pending post-op KUB results.     Angella Spivey NP

## 2020-02-18 ENCOUNTER — TELEPHONE (OUTPATIENT)
Dept: UROLOGY | Facility: CLINIC | Age: 40
End: 2020-02-18

## 2020-02-18 NOTE — TELEPHONE ENCOUNTER
----- Message from Mert Anne MD sent at 2/17/2020  9:35 PM CST -----  Plan right ureteral stent removal with ureteroscopy and stone fragment extraction.  Will schedule and plan once patient comes in for follow-up.

## 2020-02-19 DIAGNOSIS — N20.0 KIDNEY STONE: Primary | ICD-10-CM

## 2020-02-19 RX ORDER — SODIUM CHLORIDE 9 MG/ML
INJECTION, SOLUTION INTRAVENOUS CONTINUOUS
Status: CANCELLED | OUTPATIENT
Start: 2020-02-19

## 2020-03-18 ENCOUNTER — OFFICE VISIT (OUTPATIENT)
Dept: UROLOGY | Facility: CLINIC | Age: 40
End: 2020-03-18
Payer: MEDICAID

## 2020-03-18 ENCOUNTER — TELEPHONE (OUTPATIENT)
Dept: UROLOGY | Facility: CLINIC | Age: 40
End: 2020-03-18

## 2020-03-18 VITALS
TEMPERATURE: 98 F | HEIGHT: 65 IN | SYSTOLIC BLOOD PRESSURE: 128 MMHG | HEART RATE: 67 BPM | BODY MASS INDEX: 23.16 KG/M2 | WEIGHT: 139 LBS | RESPIRATION RATE: 18 BRPM | OXYGEN SATURATION: 98 % | DIASTOLIC BLOOD PRESSURE: 85 MMHG

## 2020-03-18 DIAGNOSIS — R30.0 DYSURIA: ICD-10-CM

## 2020-03-18 DIAGNOSIS — Z98.890 POST-OPERATIVE STATE: Primary | ICD-10-CM

## 2020-03-18 DIAGNOSIS — K59.00 CONSTIPATION, UNSPECIFIED CONSTIPATION TYPE: ICD-10-CM

## 2020-03-18 DIAGNOSIS — N20.0 KIDNEY STONE: ICD-10-CM

## 2020-03-18 LAB
BILIRUB SERPL-MCNC: NORMAL MG/DL
BLOOD URINE, POC: NORMAL
COLOR, POC UA: YELLOW
GLUCOSE UR QL STRIP: NORMAL
KETONES UR QL STRIP: NORMAL
LEUKOCYTE ESTERASE URINE, POC: NORMAL
NITRITE, POC UA: NORMAL
PH, POC UA: 5.5
PROTEIN, POC: 100
SPECIFIC GRAVITY, POC UA: 1.02
UROBILINOGEN, POC UA: 0.2

## 2020-03-18 PROCEDURE — 99999 PR PBB SHADOW E&M-EST. PATIENT-LVL IV: ICD-10-PCS | Mod: PBBFAC,,, | Performed by: NURSE PRACTITIONER

## 2020-03-18 PROCEDURE — 99214 OFFICE O/P EST MOD 30 MIN: CPT | Mod: PBBFAC,PO | Performed by: NURSE PRACTITIONER

## 2020-03-18 PROCEDURE — 81002 URINALYSIS NONAUTO W/O SCOPE: CPT | Mod: PBBFAC,PO | Performed by: NURSE PRACTITIONER

## 2020-03-18 PROCEDURE — 99999 PR PBB SHADOW E&M-EST. PATIENT-LVL IV: CPT | Mod: PBBFAC,,, | Performed by: NURSE PRACTITIONER

## 2020-03-18 PROCEDURE — 99024 PR POST-OP FOLLOW-UP VISIT: ICD-10-PCS | Mod: ,,, | Performed by: NURSE PRACTITIONER

## 2020-03-18 PROCEDURE — 99024 POSTOP FOLLOW-UP VISIT: CPT | Mod: ,,, | Performed by: NURSE PRACTITIONER

## 2020-03-18 RX ORDER — PHENAZOPYRIDINE HYDROCHLORIDE 200 MG/1
200 TABLET, FILM COATED ORAL 3 TIMES DAILY PRN
Qty: 9 TABLET | Refills: 0 | Status: SHIPPED | OUTPATIENT
Start: 2020-03-18 | End: 2020-03-21

## 2020-03-18 NOTE — PROGRESS NOTES
Subjective:       Patient ID: Francisco Powell is a 39 y.o. male.    Chief Complaint: Post-op Evaluation (litho) and Results (kub done this morning)    Patient is here today for his post-op evaluation. He is S/P cystoscopy with right ureteral stent replacement by Dr. Anne on 2/27/2020. Patient's only complaint at this time is dysuria and urinary urgency. Post-op KUB performed today.    Other   Chronicity: S/P cystoscopy with right ureteral stent replacement. Episode onset: 2/27/2020. The problem occurs daily. The problem has been unchanged. Associated symptoms include urinary symptoms. Pertinent negatives include no abdominal pain, anorexia, arthralgias, change in bowel habit, chills, diaphoresis, fatigue, fever, headaches, nausea, rash, swollen glands, vomiting or weakness. Nothing aggravates the symptoms. Treatments tried: cystoscopy with right ureteral stent replacement.     Review of Systems   Constitutional: Negative for activity change, appetite change, chills, diaphoresis, fatigue, fever and unexpected weight change.   Gastrointestinal: Positive for constipation. Negative for abdominal pain, anorexia, change in bowel habit, diarrhea, nausea and vomiting.   Genitourinary: Positive for dysuria and urgency. Negative for decreased urine volume, difficulty urinating, discharge, flank pain, frequency, hematuria, penile pain, penile swelling, scrotal swelling and testicular pain.   Musculoskeletal: Negative for arthralgias.   Skin: Negative for rash and wound.   Neurological: Negative for dizziness, weakness and headaches.   Hematological: Negative for adenopathy. Does not bruise/bleed easily.       Objective:      Physical Exam   Constitutional: He is oriented to person, place, and time. He appears well-developed and well-nourished. No distress.   HENT:   Head: Normocephalic and atraumatic.   Eyes: Pupils are equal, round, and reactive to light. EOM are normal.   Neck: Normal range of motion.   Cardiovascular:  Normal rate.   Pulmonary/Chest: Effort normal. No respiratory distress.   Abdominal: Soft. There is no tenderness.   Musculoskeletal: Normal range of motion. He exhibits no edema.   Neurological: He is alert and oriented to person, place, and time. Coordination normal.   Skin: Skin is warm and dry.   Psychiatric: He has a normal mood and affect. His behavior is normal. Judgment and thought content normal.   Nursing note and vitals reviewed.      Assessment:       1. Post-operative state    2. Kidney stone    3. Dysuria    4. Constipation, unspecified constipation type        Plan:       Francisco was seen today for post-op evaluation and results.    Diagnoses and all orders for this visit:    Post-operative state  -     POCT URINE DIPSTICK WITHOUT MICROSCOPE    Kidney stone  -     POCT URINE DIPSTICK WITHOUT MICROSCOPE    Dysuria  -     POCT URINE DIPSTICK WITHOUT MICROSCOPE  -     phenazopyridine (PYRIDIUM) 200 MG tablet; Take 1 tablet (200 mg total) by mouth 3 (three) times daily as needed.    Constipation, unspecified constipation type  -     lactulose (CEPHULAC) 20 gram Pack; Take 1 packet (20 g total) by mouth 3 (three) times daily. for 5 days    Other order  1. Continue taking solifenacin (Vesicare) 10 mg daily for urinary urgency.    Follow-up in 6 weeks with Dr. Anne for in-clinic right ureteral stent removal.    Angella Spivey NP

## 2020-03-18 NOTE — PATIENT INSTRUCTIONS
1. Urine dipstick   2. Continue taking solifenacin (Vesicare) 10 mg daily for urinary urgency.  3. Follow-up in 6 weeks with Dr. Anne for in-clinic right ureteral stent removal.

## 2020-03-18 NOTE — TELEPHONE ENCOUNTER
----- Message from Mert Anne MD sent at 3/18/2020 11:21 AM CDT -----  KUB is negative.  Schedule patient to return for stent removal electively in the office in about 6 weeks after emergency situation has improved.

## 2020-04-29 ENCOUNTER — PROCEDURE VISIT (OUTPATIENT)
Dept: UROLOGY | Facility: CLINIC | Age: 40
End: 2020-04-29
Payer: MEDICAID

## 2020-04-29 VITALS
DIASTOLIC BLOOD PRESSURE: 85 MMHG | WEIGHT: 138.88 LBS | HEIGHT: 65 IN | HEART RATE: 85 BPM | TEMPERATURE: 98 F | SYSTOLIC BLOOD PRESSURE: 125 MMHG | BODY MASS INDEX: 23.14 KG/M2

## 2020-04-29 DIAGNOSIS — Z46.6 ENCOUNTER FOR REMOVAL OF URETERAL STENT: Primary | ICD-10-CM

## 2020-04-29 PROCEDURE — 52310 CYSTOSCOPY: ICD-10-PCS | Mod: S$PBB,58,, | Performed by: UROLOGY

## 2020-04-29 PROCEDURE — 52310 CYSTOSCOPY AND TREATMENT: CPT | Mod: PBBFAC,PO | Performed by: UROLOGY

## 2020-04-29 RX ORDER — HYDROCODONE BITARTRATE AND ACETAMINOPHEN 5; 325 MG/1; MG/1
TABLET ORAL
COMMUNITY
Start: 2020-03-23 | End: 2021-04-19

## 2020-04-29 RX ORDER — KETOROLAC TROMETHAMINE 10 MG/1
10 TABLET, FILM COATED ORAL EVERY 6 HOURS
Qty: 20 TABLET | Refills: 1 | OUTPATIENT
Start: 2020-04-29 | End: 2021-04-19

## 2020-04-29 RX ORDER — CIPROFLOXACIN 500 MG/1
500 TABLET ORAL 2 TIMES DAILY
Qty: 10 TABLET | Refills: 0 | Status: SHIPPED | OUTPATIENT
Start: 2020-04-29 | End: 2020-05-04

## 2020-04-29 NOTE — PROCEDURES
"Francisco Powell is a 40 y.o. male patient.    Temp: 98.3 °F (36.8 °C) (04/29/20 1329)  Pulse: 85 (04/29/20 1329)  BP: 125/85 (04/29/20 1329)  Weight: 63 kg (138 lb 14.2 oz) (04/29/20 1329)  Height: 5' 5" (165.1 cm) (04/29/20 1329)       Zaynab Anne  4/29/2020    "

## 2020-04-29 NOTE — PROCEDURES
"Cystoscopy  Date/Time: 4/29/2020 1:30 PM  Performed by: Mert Anne MD  Authorized by: Mert Anne MD     Consent Done?:  Yes (Written)  Time out: Immediately prior to procedure a "time out" was called to verify the correct patient, procedure, equipment, support staff and site/side marked as required.    Indications comment:  Right ureteral stent  Position:  Supine  Anesthesia:  See MAR for details  Patient sedated?: No    Preparation: Patient was prepped and draped in usual sterile fashion      Scope type:  Flexible cystoscope  Stent inserted: No    Stent removed: Yes    Stent encrusted: No    External exam normal: Yes    Digital exam performed: No    Urethra normal: Yes    Prostate normal: Yes  Bladder neck normal: Bladder neck normal   Bladder normal: Yes      Patient tolerance:  Patient tolerated the procedure well with no immediate complications      "

## 2020-07-15 NOTE — PATIENT INSTRUCTIONS
1. Schedule ureteroscopy with possible right ureteral stent removal by Dr. Anne on Monday, 12/23/19.  2. U/A and urine cx  3. Urinary stone analysis  4. Follow-up post-up.   What Type Of Note Output Would You Prefer (Optional)?: Bullet Format How Severe Are Your Spot(S)?: moderate Have Your Spot(S) Been Treated In The Past?: has not been treated Hpi Title: Evaluation of Skin Lesions Location: Right popliteal Year Removed: 2012

## 2021-04-19 ENCOUNTER — HOSPITAL ENCOUNTER (EMERGENCY)
Facility: HOSPITAL | Age: 41
Discharge: HOME | End: 2021-04-19
Attending: INTERNAL MEDICINE
Payer: MEDICAID

## 2021-04-19 VITALS
OXYGEN SATURATION: 98 % | HEART RATE: 84 BPM | BODY MASS INDEX: 23.9 KG/M2 | HEIGHT: 64 IN | DIASTOLIC BLOOD PRESSURE: 69 MMHG | WEIGHT: 140 LBS | RESPIRATION RATE: 18 BRPM | TEMPERATURE: 98 F | SYSTOLIC BLOOD PRESSURE: 121 MMHG

## 2021-04-19 DIAGNOSIS — T24.201A PARTIAL THICKNESS BURN OF RIGHT LOWER EXTREMITY, INITIAL ENCOUNTER: Primary | ICD-10-CM

## 2021-04-19 PROCEDURE — 25000003 PHARM REV CODE 250: Mod: ER | Performed by: NURSE PRACTITIONER

## 2021-04-19 PROCEDURE — 16025 DRESS/DEBRID P-THICK BURN M: CPT | Mod: ER

## 2021-04-19 PROCEDURE — 99283 EMERGENCY DEPT VISIT LOW MDM: CPT | Mod: 25,ER

## 2021-04-19 RX ORDER — SULINDAC 150 MG/1
150 TABLET ORAL 2 TIMES DAILY
Qty: 10 TABLET | Refills: 0 | Status: SHIPPED | OUTPATIENT
Start: 2021-04-19 | End: 2021-04-24

## 2021-04-19 RX ORDER — NAPROXEN 250 MG/1
250 TABLET ORAL
Status: COMPLETED | OUTPATIENT
Start: 2021-04-19 | End: 2021-04-19

## 2021-04-19 RX ORDER — BACITRACIN 500 [USP'U]/G
OINTMENT TOPICAL
Status: COMPLETED | OUTPATIENT
Start: 2021-04-19 | End: 2021-04-19

## 2021-04-19 RX ADMIN — BACITRACIN: 500 OINTMENT TOPICAL at 08:04

## 2021-04-19 RX ADMIN — NAPROXEN 250 MG: 250 TABLET ORAL at 08:04

## 2021-12-28 ENCOUNTER — HOSPITAL ENCOUNTER (EMERGENCY)
Facility: HOSPITAL | Age: 41
Discharge: HOME | End: 2021-12-28
Attending: EMERGENCY MEDICINE
Payer: MEDICAID

## 2021-12-28 VITALS
HEART RATE: 83 BPM | RESPIRATION RATE: 18 BRPM | BODY MASS INDEX: 24.75 KG/M2 | SYSTOLIC BLOOD PRESSURE: 101 MMHG | DIASTOLIC BLOOD PRESSURE: 80 MMHG | HEIGHT: 64 IN | TEMPERATURE: 99 F | WEIGHT: 145 LBS | OXYGEN SATURATION: 97 %

## 2021-12-28 DIAGNOSIS — U07.1 COVID-19: Primary | ICD-10-CM

## 2021-12-28 LAB
CTP QC/QA: YES
SARS-COV-2 RDRP RESP QL NAA+PROBE: POSITIVE

## 2021-12-28 PROCEDURE — U0002 COVID-19 LAB TEST NON-CDC: HCPCS | Mod: ER | Performed by: EMERGENCY MEDICINE

## 2021-12-28 PROCEDURE — 25000003 PHARM REV CODE 250: Mod: ER | Performed by: PHYSICIAN ASSISTANT

## 2021-12-28 PROCEDURE — 99283 EMERGENCY DEPT VISIT LOW MDM: CPT | Mod: ER

## 2021-12-28 RX ORDER — IBUPROFEN 800 MG/1
800 TABLET ORAL
Qty: 20 TABLET | Refills: 0 | Status: SHIPPED | OUTPATIENT
Start: 2021-12-28

## 2021-12-28 RX ORDER — IBUPROFEN 600 MG/1
600 TABLET ORAL
Status: COMPLETED | OUTPATIENT
Start: 2021-12-28 | End: 2021-12-28

## 2021-12-28 RX ADMIN — IBUPROFEN 600 MG: 600 TABLET ORAL at 08:12

## 2021-12-28 NOTE — DISCHARGE INSTRUCTIONS
Isolate yourself at home away from others.  Sleep in a separate bedroom and use a separate bathroom from anyone else in the house if possible.  Wear a mask at all times, especially if you must be around others.  Cough into your elbow instead of your hand at all times.  Wash your hands for 20 seconds very frequently.    Take all medications as prescribed.  Take Tylenol for fevers as needed.    Return to the emergency department for worsening shortness of breath/difficulty breathing. Call your regular doctor for follow-up instructions or utilize telehealth services.    Thank you for coming to our Emergency Department today. It is important to remember that some problems are difficult to diagnose and may not be found during your first visit. Be sure to follow up with your primary care doctor. Make sure to tell him/her that you may have COVID-19 when you call.  If you do not have one, you may contact the one listed on your discharge paperwork or you may also call the [facility] Appointment Desk at [phone] to schedule an appointment with one.     Return to the ER with any questions/concerns, new/concerning symptoms, worsening or failure to improve. Do not drive or make any important decisions for 24 hours if you have received any pain medications, sedatives or mood altering drugs during your ER visit.

## 2021-12-28 NOTE — ED PROVIDER NOTES
Encounter Date: 12/28/2021       History     Chief Complaint   Patient presents with    Headache     Patient arrived to ED with c/o headache onset this morning, denies additional symptoms.  Reports ETOH use ciroc last night.      Patient is a 41-year-old male with no pertinent past medical history presenting to the ED for evaluation of headache.  He reports the frontal headache started last night.  He denies sick contacts.  He reports taking Advil for the headache with little relief.  He denies associated symptoms including fever, chills, cough, sore throat, chest pain or shortness of breath.  He reports he is vaccinated against COVID-19.    The history is provided by the patient.     Review of patient's allergies indicates:  No Known Allergies  Past Medical History:   Diagnosis Date    Kidney stone 11/28/2018    Staghorn calculus     Urinary tract infection      Past Surgical History:   Procedure Laterality Date    BALLOON DILATION OF URETER Right 12/3/2018    Procedure: DILATION, URETER, USING BALLOON;  Surgeon: Mert Anne MD;  Location: [facility] OR;  Service: Urology;  Laterality: Right;    BALLOON DILATION OF URETER Right 9/26/2019    Procedure: DILATION, URETER, USING BALLOON;  Surgeon: Mert Anne MD;  Location: [facility] OR;  Service: Urology;  Laterality: Right;    BRAIN SURGERY      CYSTOSCOPY W/ RETROGRADES Right 9/16/2019    Procedure: CYSTOSCOPY, WITH RETROGRADE PYELOGRAM;  Surgeon: Mert Anne MD;  Location: [facility] OR;  Service: Urology;  Laterality: Right;  Cystourethroscopy with right retrograde ureteral pyelogram    CYSTOSCOPY W/ RETROGRADES Right 10/31/2019    Procedure: CYSTOSCOPY, WITH RETROGRADE PYELOGRAM;  Surgeon: Mert Anne MD;  Location: [facility] OR;  Service: Urology;  Laterality: Right;    CYSTOSCOPY W/ URETERAL STENT PLACEMENT  12/3/2018    Procedure: CYSTOSCOPY, WITH URETERAL STENT INSERTION;  Surgeon: Mert Anne MD;  Location: [facility] OR;  Service: Urology;;     CYSTOSCOPY W/ URETERAL STENT PLACEMENT Right 10/31/2019    Procedure: CYSTOSCOPY, WITH URETERAL STENT INSERTION;  Surgeon: Mert Anne MD;  Location: [facility];  Service: Urology;  Laterality: Right;    CYSTOSCOPY W/ URETERAL STENT REMOVAL Right 10/31/2019    Procedure: CYSTOSCOPY, WITH URETERAL STENT REMOVAL;  Surgeon: Mert Anne MD;  Location: [facility];  Service: Urology;  Laterality: Right;    CYSTOSCOPY W/ URETERAL STENT REMOVAL Right 2/27/2020    Procedure: CYSTOSCOPY, WITH URETERAL STENT REMOVAL;  Surgeon: Mert Anne MD;  Location: [facility];  Service: Urology;  Laterality: Right;    CYSTOSCOPY WITH URETEROSCOPY, RETROGRADE PYELOGRAPHY, AND INSERTION OF STENT Right 9/26/2019    Procedure: CYSTOSCOPY, WITH RETROGRADE PYELOGRAM AND URETERAL STENT INSERTION;  Surgeon: Mert Anne MD;  Location: [facility];  Service: Urology;  Laterality: Right;    CYSTOSCOPY WITH URETEROSCOPY, RETROGRADE PYELOGRAPHY, AND INSERTION OF STENT Right 12/23/2019    Procedure: CYSTOSCOPY, WITH RETROGRADE PYELOGRAM AND URETERAL STENT INSERTION;  Surgeon: Mert Anne MD;  Location: [facility];  Service: Urology;  Laterality: Right;    CYSTOSCOPY WITH URETEROSCOPY, RETROGRADE PYELOGRAPHY, AND INSERTION OF STENT Right 2/27/2020    Procedure: CYSTOSCOPY, WITH RETROGRADE PYELOGRAM AND URETERAL STENT INSERTION;  Surgeon: Mert Anne MD;  Location: [facility];  Service: Urology;  Laterality: Right;    DILATION OF URETHRA N/A 12/23/2019    Procedure: DILATION, URETHRA;  Surgeon: Mert Anne MD;  Location: [facility];  Service: Urology;  Laterality: N/A;    EXTRACORPOREAL SHOCK WAVE LITHOTRIPSY Right 12/6/2018    Procedure: LITHOTRIPSY, ESWL;  Surgeon: Mert Anne MD;  Location: [facility];  Service: Urology;  Laterality: Right;    EXTRACORPOREAL SHOCK WAVE LITHOTRIPSY Right 9/19/2019    Procedure: LITHOTRIPSY, ESWL;  Surgeon: Mert Anne MD;  Location: [facility];  Service: Urology;  Laterality: Right;     EXTRACORPOREAL SHOCK WAVE LITHOTRIPSY Right 1/30/2020    Procedure: LITHOTRIPSY, ESWL;  Surgeon: Mert Anne MD;  Location: [facility] OR;  Service: Urology;  Laterality: Right;    FLUOROSCOPY Right 12/23/2019    Procedure: FLUOROSCOPY;  Surgeon: Mert Anne MD;  Location: [facility] OR;  Service: Urology;  Laterality: Right;    LASER LITHOTRIPSY Right 12/3/2018    Procedure: LITHOTRIPSY, USING LASER;  Surgeon: Mert Anne MD;  Location: [facility] OR;  Service: Urology;  Laterality: Right;    LASER LITHOTRIPSY Right 9/16/2019    Procedure: LITHOTRIPSY, USING LASER;  Surgeon: Mert Anne MD;  Location: [facility] OR;  Service: Urology;  Laterality: Right;  attempted    LASER LITHOTRIPSY Right 9/26/2019    Procedure: LITHOTRIPSY, USING LASER;  Surgeon: Mert Anne MD;  Location: [facility] OR;  Service: Urology;  Laterality: Right;    LASER LITHOTRIPSY Right 10/31/2019    Procedure: LITHOTRIPSY, USING LASER;  Surgeon: Mert Anne MD;  Location: [facility] OR;  Service: Urology;  Laterality: Right;    LITHOTRIPSY      PYELOSCOPY Right 12/23/2019    Procedure: PYELOSCOPY;  Surgeon: Mert Anne MD;  Location: [facility] OR;  Service: Urology;  Laterality: Right;    TONSILLECTOMY      URETEROPYELOSCOPY Right 10/31/2019    Procedure: URETEROPYELOSCOPY;  Surgeon: Mert Anne MD;  Location: [facility] OR;  Service: Urology;  Laterality: Right;    URETEROSCOPIC REMOVAL OF URETERIC CALCULUS Right 9/16/2019    Procedure: REMOVAL, CALCULUS, URETER, URETEROSCOPIC;  Surgeon: Mert Anne MD;  Location: [facility] OR;  Service: Urology;  Laterality: Right;    URETEROSCOPIC REMOVAL OF URETERIC CALCULUS Right 9/26/2019    Procedure: REMOVAL, CALCULUS, URETER, URETEROSCOPIC;  Surgeon: Mert Anne MD;  Location: [facility] OR;  Service: Urology;  Laterality: Right;    URETEROSCOPIC REMOVAL OF URETERIC CALCULUS Right 12/23/2019    Procedure: REMOVAL, CALCULUS, URETER, URETEROSCOPIC;  Surgeon: Mert Anne MD;  Location: [facility] OR;   "Service: Urology;  Laterality: Right;    URETEROSCOPIC REMOVAL OF URETERIC CALCULUS Right 2/27/2020    Procedure: REMOVAL, CALCULUS, URETER, URETEROSCOPIC;  Surgeon: Mert Anne MD;  Location: [facility];  Service: Urology;  Laterality: Right;    URETEROSCOPY Right 12/3/2018    Procedure: URETEROSCOPY;  Surgeon: Mert Anne MD;  Location: [facility] OR;  Service: Urology;  Laterality: Right;    URETEROSCOPY Right 9/16/2019    Procedure: URETEROSCOPY;  Surgeon: Mert Anne MD;  Location: [facility];  Service: Urology;  Laterality: Right;  right semi rigid ureteroscopy, right flexible ureteroscopy    URETEROSCOPY Right 9/26/2019    Procedure: URETEROSCOPY;  Surgeon: Mert Anne MD;  Location: [facility];  Service: Urology;  Laterality: Right;  Need "DUSTING L[facility]ER" from [facility]      URETEROSCOPY Right 10/31/2019    Procedure: URETEROSCOPY;  Surgeon: Mert Anne MD;  Location: [facility];  Service: Urology;  Laterality: Right;     Family History   Problem Relation Age of Onset    Prostate cancer Neg Hx     Kidney disease Neg Hx      Social History     Tobacco Use    Smoking status: Current Every Day Smoker     Packs/day: 0.25     Years: 17.00     Pack years: 4.25     Types: Cigarettes    Smokeless tobacco: Never Used    Tobacco comment: smoking cessation education packet given & discussed   Substance Use Topics    Alcohol use: Not Currently    Drug use: Yes     Types: Marijuana     Review of Systems   Constitutional: Negative for chills and fever.   HENT: Negative for sore throat.    Eyes: Negative for visual disturbance.   Respiratory: Negative for shortness of breath.    Cardiovascular: Negative for chest pain.   Gastrointestinal: Negative for abdominal pain, diarrhea, nausea and vomiting.   Genitourinary: Negative for dysuria.   Musculoskeletal: Negative for neck pain.   Skin: Negative for rash.   Allergic/Immunologic: Negative for immunocompromised state.   Neurological: Positive for " headaches.   Psychiatric/Behavioral: Negative for dysphoric mood.       Physical Exam     Initial Vitals [12/28/21 0750]   BP Pulse Resp Temp SpO2   101/80 83 18 98.7 °F (37.1 °C) 97 %      MAP       --         Physical Exam    Nursing note and vitals reviewed.  Constitutional: He appears well-developed and well-nourished. He is not diaphoretic. No distress.   HENT:   Head: Normocephalic and atraumatic.   Eyes: Conjunctivae and EOM are normal.   Neck: Neck supple.   Normal range of motion.  Cardiovascular: Normal rate, normal heart sounds and intact distal pulses.   Pulmonary/Chest: Breath sounds normal. No respiratory distress. He has no wheezes.   Musculoskeletal:         General: No tenderness or edema. Normal range of motion.      Cervical back: Normal range of motion and neck supple.     Neurological: He is alert and oriented to person, place, and time. GCS score is 15. GCS eye subscore is 4. GCS verbal subscore is 5. GCS motor subscore is 6.   Skin: Skin is warm and dry.   Psychiatric: He has a normal mood and affect. Thought content normal.         ED Course   Procedures  Labs Reviewed   SARS-COV-2 RDRP GENE - Abnormal; Notable for the following components:       Result Value    POC Rapid COVID Positive (*)     All other components within normal limits    Narrative:     This test utilizes isothermal nucleic acid amplification   technology to detect the SARS-CoV-2 RdRp nucleic acid segment.   The analytical sensitivity (limit of detection) is 125 genome   equivalents/mL.   A POSITIVE result implies infection with the SARS-CoV-2 virus;   the patient is presumed to be contagious.     A NEGATIVE result means that SARS-CoV-2 nucleic acids are not   present above the limit of detection. A NEGATIVE result should be   treated as presumptive. It does not rule out the possibility of   COVID-19 and should not be the sole basis for treatment decisions.   If COVID-19 is strongly suspected based on clinical and exposure    history, re-testing using an alternate molecular assay should be   considered.   This test is only for use under the Food and Drug   Administration s Emergency Use Authorization (EUA).   Commercial kits are provided by [employer].   Performance characteristics of the EUA have been independently   verified by [facility] Department of   Pathology and Laboratory Medicine.   _________________________________________________________________   The authorized Fact Sheet for Healthcare Providers and the authorized Fact   Sheet for Patients of the ID NOW COVID-19 are available on the FDA   website:     https://www.fda.gov/media/599614/download  https://www.fda.gov/media/406774/download              Imaging Results    None          Medications   ibuprofen tablet 600 mg (600 mg Oral Given 12/28/21 0850)           APC / Resident Notes:   DDx:  Influenza, viral syndrome, COVID-19, strep pharyngitis, viral pharyngitis, otitis media, sinusitis, pneumonia, bronchitis, meningitis, sepsis, others    HPI and physical exam as above.      The patient  presents with a constellation of COVID-19 symptoms and has a positive COVID-19 test result. Based upon the history and physical exam the patient does not appear to have a serious bacterial infection such as sepsis, otitis media, bacterial sinusitis, strep pharyngitis, parapharyngeal or peritonsillar abscess, meningitis.  Respiratory effort is normal. Lungs clear to auscultation bilaterally in all fields. Mucous membranes are moist and the patient is tolerating P.O. without difficulty. Patient is afebrile.  Patient is nontoxic, alert, active, and appears very well at this time just prior to discharge.  Room air oxygen saturation 97%.  I have given specific return precautions to the patient regarding dyspnea.  I will prescribe medications to treat the patient's symptoms. According to the COVID risk stratification tool, patient is low risk and does not qualify for MAB  infusion at this time.    The results and physical exam findings were reviewed with the patient.  I advised the patient to remain home and self isolate from others until 3 consecutive days symptom-free or 10 days from initial onset of symptoms The patient should wear a surgical mask at all times, especially if he must be around others.  Strict ED return precautions given concerning worsening shortness of breath/dyspnea. All questions regarding diagnosis and plan were answered to the patient's fullest possible satisfaction. Patient expressed understanding of diagnosis, discharge instructions, and return precautions.                    Clinical Impression:   Final diagnoses:  [U07.1] COVID-19 (Primary)          ED Disposition Condition    Discharge Stable        ED Prescriptions     Medication Sig Dispense Start Date End Date Auth. Provider    ibuprofen (ADVIL,MOTRIN) 800 MG tablet Take 1 tablet (800 mg total) by mouth every 6 to 8 hours as needed for Pain. 20 tablet 12/28/2021  Elisa Dimas PA-C        Follow-up Information     Follow up With Specialties Details Why Contact Info    [facility]  Schedule an appointment as soon as possible for a visit   [address] [place] [address]  [phone]      [facility] ED Emergency Medicine  If symptoms worsen [address] [id number] [phone]           Elisa Dimas PA-C  12/28/21 0916

## 2021-12-28 NOTE — Clinical Note
"Francisco"Sorin Powell was seen and treated in our emergency department on 12/28/2021.     COVID-19 is present in our communities across the state. There is limited testing for COVID at this time, so not all patients can be tested. In this situation, your employee meets the following criteria:    Francisco Powell has met the criteria for COVID-19 testing and has a POSITIVE result. He can return to work once they are asymptomatic for 72 hours without the use of fever reducing medications AND at least ten days from the first positive result.     If you have any questions or concerns, or if I can be of further assistance, please do not hesitate to contact me.    Sincerely,             Elisa Dimas PA-C"

## 2022-02-16 NOTE — TELEPHONE ENCOUNTER
----- Message from Mert Anne MD sent at 9/12/2019  2:36 PM CDT -----  Urine appears to be infected.  If no culture ordered please order urine culture.  
Culture pending  
Introduction Text (Please End With A Colon): [facility]:[id number],
Detail Level: Detailed

## 2023-01-30 NOTE — TELEPHONE ENCOUNTER
----- Message from Angella Spivey NP sent at 11/5/2019  9:33 PM CST -----  Please inform patient stone from right ureter came back 100% calcium oxalate. Low oxalate diet recommended. Please mail patient sample food list.  
DISCHARGE